# Patient Record
Sex: FEMALE | Race: BLACK OR AFRICAN AMERICAN | Employment: OTHER | URBAN - METROPOLITAN AREA
[De-identification: names, ages, dates, MRNs, and addresses within clinical notes are randomized per-mention and may not be internally consistent; named-entity substitution may affect disease eponyms.]

---

## 2017-03-10 DIAGNOSIS — I10 HYPERTENSION GOAL BP (BLOOD PRESSURE) < 140/90: ICD-10-CM

## 2017-03-10 NOTE — TELEPHONE ENCOUNTER
Bisoprolol Fumarate 10 MG     Last Written Prescription Date: 9/6/16  Last Fill Quantity: 180, # refills: 1  Last Office Visit with G, [facility] or [facility] prescribing provider: 11/28/16       Potassium   Date Value Ref Range Status   10/24/2016 3.5 3.4 - 5.3 mmol/L Final     Creatinine   Date Value Ref Range Status   10/24/2016 1.36 (H) 0.52 - 1.04 mg/dL Final     BP Readings from Last 3 Encounters:   11/28/16 132/62   10/24/16 150/60   05/16/16 120/70

## 2017-03-13 RX ORDER — BISOPROLOL FUMARATE 10 MG/1
TABLET, FILM COATED ORAL
Qty: 180 TABLET | Refills: 2 | Status: SHIPPED | OUTPATIENT
Start: 2017-03-13 | End: 2017-12-14

## 2017-03-13 NOTE — TELEPHONE ENCOUNTER
Prescription approved per [facility] Refill Protocol. No lab  Nicole Diaz RN- Triage [employer]

## 2017-03-29 ENCOUNTER — TRANSFERRED RECORDS (OUTPATIENT)
Dept: HEALTH INFORMATION MANAGEMENT | Facility: CLINIC | Age: 82
End: 2017-03-29

## 2017-03-29 LAB
CREAT SERPL-MCNC: 1.36 MG/DL (ref 0.6–0.88)
GFR SERPL CREATININE-BSD FRML MDRD: 33 ML/MIN/1.73M2
GLUCOSE SERPL-MCNC: 130 MG/DL (ref 65–99)
HBA1C MFR BLD: 7.3 % (ref 4–6)
POTASSIUM SERPL-SCNC: 4.1 MMOL/L (ref 3.5–5.3)
TSH SERPL-ACNC: 2.78 UIU/ML (ref 0.3–5)

## 2017-03-31 DIAGNOSIS — I10 HYPERTENSION GOAL BP (BLOOD PRESSURE) < 140/90: Primary | ICD-10-CM

## 2017-03-31 NOTE — TELEPHONE ENCOUNTER
losartan-hydrochlorothiazide (HYZAAR) 100-25 MG per tablet      Last Written Prescription Date: 9/27/16  Last Fill Quantity: 90, # refills: 1  Last Office Visit with [facility], [facility] or [facility] prescribing provider: 11/28/16       Potassium   Date Value Ref Range Status   10/24/2016 3.5 3.4 - 5.3 mmol/L Final     Creatinine   Date Value Ref Range Status   10/24/2016 1.36 (H) 0.52 - 1.04 mg/dL Final     BP Readings from Last 3 Encounters:   11/28/16 132/62   10/24/16 150/60   05/16/16 120/70

## 2017-04-03 RX ORDER — LOSARTAN POTASSIUM AND HYDROCHLOROTHIAZIDE 25; 100 MG/1; MG/1
TABLET ORAL
Qty: 90 TABLET | Refills: 0 | Status: SHIPPED | OUTPATIENT
Start: 2017-04-03 | End: 2017-06-29

## 2017-04-03 NOTE — TELEPHONE ENCOUNTER
Patient is due for K lab test per 10/2016 lab letter from Dr Carvalho.  I placed a lab order for CMP.  Chapis will be coming tomorro4/4/17  at 10:30 for labs at .  Dr Carvalho, review and add labs as needed.  Nicole Diaz RN- Triage [employer]

## 2017-04-04 DIAGNOSIS — E87.6 HYPOKALEMIA: ICD-10-CM

## 2017-04-04 DIAGNOSIS — I10 HYPERTENSION GOAL BP (BLOOD PRESSURE) < 140/90: ICD-10-CM

## 2017-04-04 PROCEDURE — 80048 BASIC METABOLIC PNL TOTAL CA: CPT | Performed by: INTERNAL MEDICINE

## 2017-04-04 PROCEDURE — 36415 COLL VENOUS BLD VENIPUNCTURE: CPT | Performed by: INTERNAL MEDICINE

## 2017-04-04 NOTE — LETTER
[facility]  [address]  [address]  [place] [address]  [phone]                                                                                                           Chapis Riggs  [address]  [place] [address]    April 5, 2017      Dear Chapis,    The results of your recent tests were reviewed and are enclosed.         These lab results are stable, and the potassium level is much better. You can cut back the potassium to just take one every other day.            Thank you for choosing [facility].  We appreciate the opportunity to serve you and look forward to supporting your healthcare needs in the future.    If you have any questions or concerns, please call me or my staff at [phone].      Sincerely,    Christiano Carvalho MD      Results for orders placed or performed in visit on 04/04/17   Basic metabolic panel  (Ca, Cl, CO2, Creat, Gluc, K, Na, BUN)   Result Value Ref Range    Sodium 143 133 - 144 mmol/L    Potassium 4.6 3.4 - 5.3 mmol/L    Chloride 105 94 - 109 mmol/L    Carbon Dioxide 33 (H) 20 - 32 mmol/L    Anion Gap 5 3 - 14 mmol/L    Glucose 116 (H) 70 - 99 mg/dL    Urea Nitrogen 20 7 - 30 mg/dL    Creatinine 1.14 (H) 0.52 - 1.04 mg/dL    GFR Estimate 44 (L) >60 mL/min/1.7m2    GFR Estimate If Black 54 (L) >60 mL/min/1.7m2    Calcium 10.1 8.5 - 10.1 mg/dL

## 2017-04-05 LAB
ANION GAP SERPL CALCULATED.3IONS-SCNC: 5 MMOL/L (ref 3–14)
BUN SERPL-MCNC: 20 MG/DL (ref 7–30)
CALCIUM SERPL-MCNC: 10.1 MG/DL (ref 8.5–10.1)
CHLORIDE SERPL-SCNC: 105 MMOL/L (ref 94–109)
CO2 SERPL-SCNC: 33 MMOL/L (ref 20–32)
CREAT SERPL-MCNC: 1.14 MG/DL (ref 0.52–1.04)
GFR SERPL CREATININE-BSD FRML MDRD: 44 ML/MIN/1.7M2
GLUCOSE SERPL-MCNC: 116 MG/DL (ref 70–99)
POTASSIUM SERPL-SCNC: 4.6 MMOL/L (ref 3.4–5.3)
SODIUM SERPL-SCNC: 143 MMOL/L (ref 133–144)

## 2017-04-05 RX ORDER — POTASSIUM CHLORIDE 750 MG/1
10 TABLET, EXTENDED RELEASE ORAL EVERY OTHER DAY
Qty: 45 TABLET | Refills: 1 | Status: SHIPPED | OUTPATIENT
Start: 2017-04-05 | End: 2017-04-24

## 2017-04-05 NOTE — PROGRESS NOTES
Results for orders placed or performed in visit on 04/04/17   Basic metabolic panel  (Ca, Cl, CO2, Creat, Gluc, K, Na, BUN)   Result Value Ref Range    Sodium 143 133 - 144 mmol/L    Potassium 4.6 3.4 - 5.3 mmol/L    Chloride 105 94 - 109 mmol/L    Carbon Dioxide 33 (H) 20 - 32 mmol/L    Anion Gap 5 3 - 14 mmol/L    Glucose 116 (H) 70 - 99 mg/dL    Urea Nitrogen 20 7 - 30 mg/dL    Creatinine 1.14 (H) 0.52 - 1.04 mg/dL    GFR Estimate 44 (L) >60 mL/min/1.7m2    GFR Estimate If Black 54 (L) >60 mL/min/1.7m2    Calcium 10.1 8.5 - 10.1 mg/dL     Letter sent.            These lab results are stable, and the potassium level is much better. You can cut back the potassium to just take one every other day.

## 2017-04-24 DIAGNOSIS — E87.6 HYPOKALEMIA: ICD-10-CM

## 2017-04-24 RX ORDER — POTASSIUM CHLORIDE 750 MG/1
10 TABLET, EXTENDED RELEASE ORAL EVERY OTHER DAY
Qty: 45 TABLET | Refills: 1 | Status: SHIPPED | OUTPATIENT
Start: 2017-04-24 | End: 2018-07-23

## 2017-04-24 NOTE — TELEPHONE ENCOUNTER
Potassium 10meq QD    Routing refill request to provider for review/approval because:  Labs out of range:  Creatinine and frequency requested differs from med list sig. Lab letter dated 4-5-17 per Dr. Christiano Carvalho advised patient to cut back to K+ 10meq QOD  Last Written Prescription Date: 4-5-17 at 10meq QOD  Last Fill Quantity: 45, # refills: 1  [facility] Office Visit with [facility] or [facility] prescribing provider: 11-28-16       Potassium   Date Value Ref Range Status   04/04/2017 4.6 3.4 - 5.3 mmol/L Final     Creatinine   Date Value Ref Range Status   04/04/2017 1.14 (H) 0.52 - 1.04 mg/dL Final     BP Readings from Last 3 Encounters:   11/28/16 132/62   10/24/16 150/60   05/16/16 120/70     Refill resent to pharmacy

## 2017-05-07 DIAGNOSIS — I10 HYPERTENSION GOAL BP (BLOOD PRESSURE) < 140/90: ICD-10-CM

## 2017-05-08 RX ORDER — AMLODIPINE BESYLATE 10 MG/1
TABLET ORAL
Qty: 90 TABLET | Refills: 1 | Status: SHIPPED | OUTPATIENT
Start: 2017-05-08 | End: 2017-11-03

## 2017-05-08 NOTE — TELEPHONE ENCOUNTER
Labs not required for this med  Prescription approved per [facility] Refill Protocol.

## 2017-05-08 NOTE — TELEPHONE ENCOUNTER
Amlodipine 10mg      Last Written Prescription Date: 9/27/2016  Last Fill Quantity: 90, # refills: 1  Last Office Visit with G, P or [facility] prescribing provider: 11/28/2016       Potassium   Date Value Ref Range Status   04/04/2017 4.6 3.4 - 5.3 mmol/L Final     Creatinine   Date Value Ref Range Status   04/04/2017 1.14 (H) 0.52 - 1.04 mg/dL Final     BP Readings from Last 3 Encounters:   11/28/16 132/62   10/24/16 150/60   05/16/16 120/70

## 2017-08-07 ENCOUNTER — OFFICE VISIT (OUTPATIENT)
Dept: FAMILY MEDICINE | Facility: CLINIC | Age: 82
End: 2017-08-07
Payer: COMMERCIAL

## 2017-08-07 VITALS
HEART RATE: 78 BPM | HEIGHT: 63 IN | WEIGHT: 119 LBS | BODY MASS INDEX: 21.09 KG/M2 | OXYGEN SATURATION: 100 % | SYSTOLIC BLOOD PRESSURE: 134 MMHG | DIASTOLIC BLOOD PRESSURE: 66 MMHG | RESPIRATION RATE: 20 BRPM | TEMPERATURE: 97.8 F

## 2017-08-07 DIAGNOSIS — E11.9 TYPE 2 DIABETES MELLITUS WITHOUT COMPLICATION, WITH LONG-TERM CURRENT USE OF INSULIN (H): Primary | ICD-10-CM

## 2017-08-07 DIAGNOSIS — I10 HYPERTENSION GOAL BP (BLOOD PRESSURE) < 140/90: ICD-10-CM

## 2017-08-07 DIAGNOSIS — Z79.4 TYPE 2 DIABETES MELLITUS WITHOUT COMPLICATION, WITH LONG-TERM CURRENT USE OF INSULIN (H): Primary | ICD-10-CM

## 2017-08-07 PROCEDURE — 99213 OFFICE O/P EST LOW 20 MIN: CPT | Performed by: INTERNAL MEDICINE

## 2017-08-07 NOTE — PROGRESS NOTES
SUBJECTIVE:                                                    Chapis Riggs is a 89 year old female who presents to clinic today for the following health issues:      Needs form filled out for driving for DM                 Sees endo 2x per year; due in September.                    Uses 8 units 70/30 in the AM , 4 units PM                    No hypoglycemia; needs a form for purposes of her driving privileges.     She also checks her glucose every time before she drives.                                         Problem list and histories reviewed & adjusted, as indicated.      Current Outpatient Prescriptions   Medication Sig Dispense Refill     losartan-hydrochlorothiazide (HYZAAR) 100-25 MG per tablet TAKE ONE TABLET BY MOUTH ONE TIME DAILY 90 tablet 1     amLODIPine (NORVASC) 10 MG tablet TAKE 1 TABLET (10 MG) BY MOUTH DAILY 90 tablet 1     potassium chloride SA (K-DUR/KLOR-CON M) 10 MEQ CR tablet Take 1 tablet (10 mEq) by mouth every other day 45 tablet 1     Bisoprolol Fumarate 10 MG TABS TAKE TWO TABLETS BY MOUTH DAILY 180 tablet 2     pravastatin (PRAVACHOL) 40 MG tablet Take 1 tablet (40 mg) by mouth daily 90 tablet 3     ONE TOUCH ULTRA test strip TEST 4 TIMES DAILY 400 strip 1     blood glucose (ONE TOUCH ULTRA) test strip Pt tests 4 x / day 400 strip 1     insulin NPH-insulin regular (HUMULIN MIX 70/30, NOVOLIN MIX 70/30) SUSP 100 UNIT/ML Inject 4 Units Subcutaneous See Admin Instructions evening       Calcium Citrate-Vitamin D (CITRACAL + D PO) Take 2 tablets by mouth daily 1200 calcium and 1000 iu vit d       glucose blood VI test strips (ONE TOUCH ULTRA TEST) strip Use to test blood sugars 6 to 8 times daily or as directed. 200 strip 9     nitroglycerin (NITROSTAT) 0.4 MG SL tablet Place 1 tablet under the tongue every 5 minutes as needed for chest pain. 25 tablet 3     aspirin 81 MG tablet Take 1 tablet by mouth daily.       Multiple Vitamins-Minerals (CENTRUM SILVER PO) Take 1 tablet by  "mouth.       insulin NPH-insulin regular (NOVOLIN 70/30) (70-30) 100 UNIT/ML injection Inject 8 Units Subcutaneous every morning Patient taking 8Units at 7:30 morning and 5Units at 5:30 evening       naproxen sodium (ALEVE) 220 MG capsule Take 220 mg by mouth as needed 60 capsule      Allergies   Allergen Reactions     Atorvastatin      Dizzy and weak     Lisinopril-Hctz Cough     Sulfa Drugs      BP Readings from Last 3 Encounters:   08/07/17 134/66   11/28/16 132/62   10/24/16 150/60    Wt Readings from Last 3 Encounters:   08/07/17 119 lb (54 kg)   11/28/16 124 lb 9.6 oz (56.5 kg)   10/24/16 124 lb (56.2 kg)                      Reviewed and updated as needed this visit by clinical staffTobacco  Allergies  Meds  Med Hx  Surg Hx  Fam Hx  Soc Hx      Reviewed and updated as needed this visit by Provider         ROS:  CONSTITUTIONAL:NEGATIVE for fever, chills, change in weight  CV: NEGATIVE for chest pain, palpitations or peripheral edema  ENDOCRINE: NEGATIVE for polydipsia and polyuria    OBJECTIVE:                                                    /66 (BP Location: Left arm, Patient Position: Chair, Cuff Size: Adult Regular)  Pulse 78  Temp 97.8  F (36.6  C)  Resp 20  Ht 5' 3\" (1.6 m)  Wt 119 lb (54 kg)  SpO2 100%  Breastfeeding? No  BMI 21.08 kg/m2  Body mass index is 21.08 kg/(m^2).  GENERAL APPEARANCE: healthy, alert and no distress  CV: regular rates and rhythm, normal S1 S2, no S3 or S4 and no murmur, click or rub  NEURO: Normal strength and tone, mentation intact and speech normal    Diagnostic test results:  none        ASSESSMENT/PLAN:                                                        ICD-10-CM    1. Type 2 diabetes mellitus without complication, with long-term current use of insulin (H) E11.9     Z79.4    2. Hypertension goal BP (blood pressure) < 140/90 I10        Should be ok for driving.           DMV form completed.   Follow up with Provider - CPE 10/16 or 11/16     Christiano DAVIES" MD aMia  [facility]

## 2017-08-07 NOTE — NURSING NOTE
"Chief Complaint   Patient presents with     Forms       Initial /66 (BP Location: Left arm, Patient Position: Chair, Cuff Size: Adult Regular)  Pulse 78  Temp 97.8  F (36.6  C)  Resp 20  Ht 5' 3\" (1.6 m)  Wt 119 lb (54 kg)  SpO2 100%  Breastfeeding? No  BMI 21.08 kg/m2 Estimated body mass index is 21.08 kg/(m^2) as calculated from the following:    Height as of this encounter: 5' 3\" (1.6 m).    Weight as of this encounter: 119 lb (54 kg).  Medication Reconciliation: complete   Joy Espinoza LPN  "

## 2017-08-28 ENCOUNTER — OFFICE VISIT (OUTPATIENT)
Dept: FAMILY MEDICINE | Facility: CLINIC | Age: 82
End: 2017-08-28
Payer: COMMERCIAL

## 2017-08-28 ENCOUNTER — RADIANT APPOINTMENT (OUTPATIENT)
Dept: GENERAL RADIOLOGY | Facility: CLINIC | Age: 82
End: 2017-08-28
Attending: INTERNAL MEDICINE
Payer: COMMERCIAL

## 2017-08-28 VITALS
SYSTOLIC BLOOD PRESSURE: 120 MMHG | HEIGHT: 63 IN | OXYGEN SATURATION: 100 % | BODY MASS INDEX: 20.91 KG/M2 | DIASTOLIC BLOOD PRESSURE: 70 MMHG | TEMPERATURE: 97.8 F | WEIGHT: 118 LBS | RESPIRATION RATE: 20 BRPM | HEART RATE: 65 BPM

## 2017-08-28 DIAGNOSIS — M54.50 CHRONIC MIDLINE LOW BACK PAIN WITHOUT SCIATICA: ICD-10-CM

## 2017-08-28 DIAGNOSIS — G89.29 CHRONIC MIDLINE LOW BACK PAIN WITHOUT SCIATICA: ICD-10-CM

## 2017-08-28 DIAGNOSIS — K59.09 OTHER CONSTIPATION: ICD-10-CM

## 2017-08-28 DIAGNOSIS — Z86.0100 HISTORY OF COLONIC POLYPS: ICD-10-CM

## 2017-08-28 DIAGNOSIS — G89.29 CHRONIC MIDLINE LOW BACK PAIN WITHOUT SCIATICA: Primary | ICD-10-CM

## 2017-08-28 DIAGNOSIS — M54.50 CHRONIC MIDLINE LOW BACK PAIN WITHOUT SCIATICA: Primary | ICD-10-CM

## 2017-08-28 PROCEDURE — 99214 OFFICE O/P EST MOD 30 MIN: CPT | Performed by: INTERNAL MEDICINE

## 2017-08-28 PROCEDURE — 72100 X-RAY EXAM L-S SPINE 2/3 VWS: CPT

## 2017-08-28 NOTE — MR AVS SNAPSHOT
After Visit Summary   8/28/2017    Chapis Riggs    MRN: [id number]           Patient Information     Date Of Birth          3/2/1923        Visit Information        Provider Department      8/28/2017 9:30 AM Christiano Carvalho MD [facility]        Today's Diagnoses     Chronic midline low back pain without sciatica    -  1    Other constipation        History of colonic polyps          Care Instructions    Let's do this: go ahead and take 2 Tylenol tablets every day.                Get some Miralax and use a capful or 1/2 of a capful, every few days or even every day.                       Plan on a flexible sigmoidoscopy with Dr Fabian's office.                                  Plan on some physical therapy.          Follow-ups after your visit        Additional Services     COLORECTAL SURGERY REFERRAL       Your provider has referred you to: N: [facility] [phone]   [email or web address]    Referral Reason(s): Flex Sig.                                      (THIS PATIENT HAD A 2.5 CM POLYP REMOVED, AT 25 CM, BY DR FABIAN IN 2004. SHE HAD AN INCOMPLETE COLONOSCOPY IN 2009.    SHE HAS WORSENING CONSTIPATION WITH LOW BACK PAIN)  Special Concerns: Diabetes  This referral is: Elective (week +)  It is OK to leave a message on patient's voicemail.    Please be aware that coverage of these services is subject to the terms and limitations of your health insurance plan.  Call member services at your health plan with any benefit or coverage questions.      Please bring the following with you to your appointment:    (1) Any X-Rays, CTs or MRIs which have been performed.  Contact the facility where they were done to arrange for  prior to your scheduled appointment.    (2) List of current medications  (3) This referral request   (4) Any documents/labs given to you for this referral            TRUPTI PT, HAND, AND  CHIROPRACTIC REFERRAL       **This order will print in the [facility] Scheduling Office**    Physical Therapy, Hand Therapy and Chiropractic Care are available through:    *[facility]  *[facility]  *[facility]    Call one number to schedule at any of the above locations: [phone].    Your provider has referred you to: [facility] - PT and/or Chiropractic Care determined by clinical presentation at [facility] or [facility] Initial Visit    Indication/Reason for Referral: Low Back Pain  Onset of Illness: 6 months  Therapy Orders: Evaluate and Treat  Special Programs: None  Special Request: None    Lucas Sub-Score (Q5-9): 2 (08/28/17 0938)  Lucas Total Score (all 9): 4 (08/28/17 0938)    Additional Comments for the Therapist or Chiropractor: see spine x-ray    Please be aware that coverage of these services is subject to the terms and limitations of your health insurance plan.  Call member services at your health plan with any benefit or coverage questions.      Please bring the following to your appointment:    *Your personal calendar for scheduling future appointments  *Comfortable clothing                  Who to contact     If you have questions or need follow up information about today's clinic visit or your schedule please contact [facility] directly at [phone].  Normal or non-critical lab and imaging results will be communicated to you by MyChart, letter or phone within 4 business days after the clinic has received the results. If you do not hear from us within 7 days, please contact the clinic through MyChart or phone. If you have a critical or abnormal lab result, we will notify you by phone as soon as possible.  Submit refill requests through [facility] or call your pharmacy and they will forward the refill request to us. Please allow 3 business days for your refill to be completed.          Additional Information  "About Your Visit        Care EveryWhere ID     This is your Care EveryWhere ID. This could be used by other organizations to access your [place] medical records  [id number]        Your Vitals Were     Pulse Temperature Respirations Height Pulse Oximetry Breastfeeding?    65 97.8  F (36.6  C) 20 5' 3\" (1.6 m) 100% No    BMI (Body Mass Index)                   20.9 kg/m2            Blood Pressure from Last 3 Encounters:   08/28/17 120/70   08/07/17 134/66   11/28/16 132/62    Weight from Last 3 Encounters:   08/28/17 118 lb (53.5 kg)   08/07/17 119 lb (54 kg)   11/28/16 124 lb 9.6 oz (56.5 kg)              We Performed the Following     COLORECTAL SURGERY REFERRAL     TRUPTI PT, HAND, AND CHIROPRACTIC REFERRAL          Today's Medication Changes          These changes are accurate as of: 8/28/17 10:49 AM.  If you have any questions, ask your nurse or doctor.               Stop taking these medicines if you haven't already. Please contact your care team if you have questions.     ALEVE 220 MG capsule   Generic drug:  naproxen sodium   Stopped by:  Christiano Carvalho MD                    Primary Care Provider Office Phone # Fax #    Christiano Carvalho [phone] [address] [place] [address]        Equal Access to Services     COREEN STEVENS AH: Hadii aad ku hadasho Soomaali, waaxda luqadaha, qaybta kaalmada adeegyada, jc jones. So [facility] [phone].    ATENCIÓN: Si habla español, tiene a rubalcava disposición servicios gratuitos de asistencia lingüística. Llame al [phone].    We comply with applicable federal civil rights laws and [place] laws. We do not discriminate on the basis of race, color, national origin, age, disability sex, sexual orientation or gender identity.            Thank you!     Thank you for choosing [facility]  for your care. Our goal is always to provide you with excellent care. Hearing back from our " patients is one way we can continue to improve our services. Please take a few minutes to complete the written survey that you may receive in the mail after your visit with us. Thank you!             Your Updated Medication List - Protect others around you: Learn how to safely use, store and throw away your medicines at www.disposemymeds.org.          This list is accurate as of: 8/28/17 10:49 AM.  Always use your most recent med list.                   Brand Name Dispense Instructions for use Diagnosis    amLODIPine 10 MG tablet    NORVASC    90 tablet    TAKE 1 TABLET (10 MG) BY MOUTH DAILY    Hypertension goal BP (blood pressure) < 140/90       aspirin 81 MG tablet      Take 1 tablet by mouth daily.        Bisoprolol Fumarate 10 MG Tabs     180 tablet    TAKE TWO TABLETS BY MOUTH DAILY    Hypertension goal BP (blood pressure) < 140/90       * blood glucose monitoring test strip    ONE TOUCH ULTRA    200 strip    Use to test blood sugars 6 to 8 times daily or as directed.    Type 1 diabetes, HbA1c goal < 8% (H)       * blood glucose monitoring test strip    ONE TOUCH ULTRA    400 strip    Pt tests 4 x / day    Type 2 diabetes, HbA1C goal < 8% (H)       * ONE TOUCH ULTRA test strip   Generic drug:  blood glucose monitoring     400 strip    TEST 4 TIMES DAILY    Type 2 diabetes, HbA1C goal < 8% (H)       CENTRUM SILVER PO      Take 1 tablet by mouth.        CITRACAL + D PO      Take 2 tablets by mouth daily 1200 calcium and 1000 iu vit d        * insulin NPH-insulin regular injection    HumuLIN MIX 70/30/NovoLIN MIX 70/30     Inject 4 Units Subcutaneous See Admin Instructions evening        * insulin NPH-insulin regular injection    HumuLIN MIX 70/30/NovoLIN MIX 70/30     Inject 8 Units Subcutaneous every morning Patient taking 8Units at 7:30 morning and 5Units at 5:30 evening        losartan-hydrochlorothiazide 100-25 MG per tablet    HYZAAR    90 tablet    TAKE ONE TABLET BY MOUTH ONE TIME DAILY    Hypertension  goal BP (blood pressure) < 140/90       nitroGLYcerin 0.4 MG sublingual tablet    NITROSTAT    25 tablet    Place 1 tablet under the tongue every 5 minutes as needed for chest pain.    Angina effort (H)       potassium chloride SA 10 MEQ CR tablet    K-DUR/KLOR-CON M    45 tablet    Take 1 tablet (10 mEq) by mouth every other day    Hypokalemia       pravastatin 40 MG tablet    PRAVACHOL    90 tablet    Take 1 tablet (40 mg) by mouth daily    Hyperlipidemia LDL goal <100       * Notice:  This list has 5 medication(s) that are the same as other medications prescribed for you. Read the directions carefully, and ask your doctor or other care provider to review them with you.

## 2017-08-28 NOTE — PROGRESS NOTES
"  SUBJECTIVE:   Chapis Riggs is a 89 year old female who presents to clinic today for the following health issues:      Back Pain       Duration: unknown        Specific cause: none    Description:   Location of pain: low back-bi-lat and mid area  Character of pain: dull ache, gnawing, cramping and constant  Pain radiation:radiates into the left leg  New numbness or weakness in legs, not attributed to pain:  no     Intensity: moderate    History:   Pain interferes with job: Not applicable,   History of back problems: no prior back problems  Any previous MRI or X-rays: None  Sees a specialist for back pain:  No  Therapies tried without relief: none    Alleviating factors:   Improved by: acetaminophen (Tylenol)      Precipitating factors:  Worsened by: any activity really    Functional and Psychosocial Screen (DocVue STarT Back):      Most recent score:    CHRISTO START BACK TOTAL SCORE 8/28/2017   Total Score (all 9) 4                 Also discuss \"bowels issues with this back pain                                             6 months or so of low back pain; not clearly having radicular sx.          Good relief \"for a day and a half\" with APAP.                                Passing firm stools.           Sometimes they are hard pellets.         She is concerned about her colon.         Recall that she had a large polyp removed from the sigmoid colon 13 years ago.               She had a partial colonoscopy in 2009.   Problem list and histories reviewed & adjusted, as indicated.      C/o chronic cough; no change.                      A [facility] nurse recommended screening mammogram,bone density,colonoscopy,and a hearing test!  Current Outpatient Prescriptions   Medication Sig Dispense Refill     losartan-hydrochlorothiazide (HYZAAR) 100-25 MG per tablet TAKE ONE TABLET BY MOUTH ONE TIME DAILY 90 tablet 1     amLODIPine (NORVASC) 10 MG tablet TAKE 1 TABLET (10 MG) BY MOUTH DAILY 90 tablet 1     potassium " chloride SA (K-DUR/KLOR-CON M) 10 MEQ CR tablet Take 1 tablet (10 mEq) by mouth every other day 45 tablet 1     Bisoprolol Fumarate 10 MG TABS TAKE TWO TABLETS BY MOUTH DAILY 180 tablet 2     pravastatin (PRAVACHOL) 40 MG tablet Take 1 tablet (40 mg) by mouth daily 90 tablet 3     ONE TOUCH ULTRA test strip TEST 4 TIMES DAILY 400 strip 1     blood glucose (ONE TOUCH ULTRA) test strip Pt tests 4 x / day 400 strip 1     insulin NPH-insulin regular (HUMULIN MIX 70/30, NOVOLIN MIX 70/30) SUSP 100 UNIT/ML Inject 4 Units Subcutaneous See Admin Instructions evening       Calcium Citrate-Vitamin D (CITRACAL + D PO) Take 2 tablets by mouth daily 1200 calcium and 1000 iu vit d       glucose blood VI test strips (ONE TOUCH ULTRA TEST) strip Use to test blood sugars 6 to 8 times daily or as directed. 200 strip 9     nitroglycerin (NITROSTAT) 0.4 MG SL tablet Place 1 tablet under the tongue every 5 minutes as needed for chest pain. 25 tablet 3     aspirin 81 MG tablet Take 1 tablet by mouth daily.       Multiple Vitamins-Minerals (CENTRUM SILVER PO) Take 1 tablet by mouth.       insulin NPH-insulin regular (NOVOLIN 70/30) (70-30) 100 UNIT/ML injection Inject 8 Units Subcutaneous every morning Patient taking 8Units at 7:30 morning and 5Units at 5:30 evening       Allergies   Allergen Reactions     Atorvastatin      Dizzy and weak     Lisinopril-Hctz Cough     Sulfa Drugs      BP Readings from Last 3 Encounters:   08/28/17 120/70   08/07/17 134/66   11/28/16 132/62    Wt Readings from Last 3 Encounters:   08/28/17 118 lb (53.5 kg)   08/07/17 119 lb (54 kg)   11/28/16 124 lb 9.6 oz (56.5 kg)                      Reviewed and updated as needed this visit by clinical staffTobacco  Allergies  Meds  Med Hx  Surg Hx  Fam Hx  Soc Hx      Reviewed and updated as needed this visit by Provider         ROS:  CONSTITUTIONAL:POSITIVE  for weight loss of approximately 5 pounds since last fall  GI: NEGATIVE for hematochezia and melena  :  "negative for incontinence or retention  MUSCULOSKELETAL: NEGATIVE for injury to her back  NEURO: NEGATIVE for paresthesias legs and radicular pain legs    OBJECTIVE:                                                    /70 (BP Location: Left arm, Patient Position: Chair, Cuff Size: Adult Regular)  Pulse 65  Temp 97.8  F (36.6  C)  Resp 20  Ht 5' 3\" (1.6 m)  Wt 118 lb (53.5 kg)  SpO2 100%  Breastfeeding? No  BMI 20.9 kg/m2  Body mass index is 20.9 kg/(m^2).  GENERAL APPEARANCE: alert and no distress  ABDOMEN: soft, nontender, without hepatosplenomegaly or masses  MS: decreased range of motion lumbar spine    Diagnostic test results:  Xray -   Recent Results (from the past 744 hour(s))   XR Lumbar Spine 2/3 Views    Narrative    LUMBAR SPINE TWO TO THREE VIEWS 8/28/2017 10:30 AM     HISTORY: Low back pain.    COMPARISON: None.      Impression    IMPRESSION: There are five lumbar type vertebrae. There is severe  facet arthropathy in the mid and lower lumbar spine. Moderate to  severe multilevel degenerative disc disease. Grade 1 anterolisthesis  at L3-L4 and L4-L5. The vertebral body heights are maintained.    BERTIN VIZCARRA MD            ASSESSMENT/PLAN:                                                      ICD-10-CM    1. Chronic midline low back pain without sciatica M54.5 XR Lumbar Spine 2/3 Views    G89.29    2. Other constipation K59.09    3. History of colonic polyps Z86.010 COLORECTAL SURGERY REFERRAL       There are multiple reasons for low back pain on this spine x-ray.                    After the visit was completed, the patient wanted me to meet her daughter who was waiting in the waiting room.                  We brought her into the exam room,and went over the findings again. The daughter was especially eager to have her mother undergo some physical therapy.                  Fortunately she gets good relief with just 2 Tylenol tablets per day.  Patient Instructions   Let's do this: go ahead " and take 2 Tylenol tablets every day.                Get some Miralax and use a capful or 1/2 of a capful, every few days or even every day.                       Plan on a flexible sigmoidoscopy with Dr Fabian's office.                                  Plan on some physical therapy.      Christiano Carvalho MD  [facility]

## 2017-08-28 NOTE — NURSING NOTE
"Chief Complaint   Patient presents with     Musculoskeletal Problem       Initial /70 (BP Location: Left arm, Patient Position: Chair, Cuff Size: Adult Regular)  Pulse 65  Temp 97.8  F (36.6  C)  Resp 20  Ht 5' 3\" (1.6 m)  Wt 118 lb (53.5 kg)  SpO2 100%  Breastfeeding? No  BMI 20.9 kg/m2 Estimated body mass index is 20.9 kg/(m^2) as calculated from the following:    Height as of this encounter: 5' 3\" (1.6 m).    Weight as of this encounter: 118 lb (53.5 kg).  Medication Reconciliation: complete   Joy Espinoza LPN  "

## 2017-08-28 NOTE — PATIENT INSTRUCTIONS
Let's do this: go ahead and take 2 Tylenol tablets every day.                Get some Miralax and use a capful or 1/2 of a capful, every few days or even every day.                       Plan on a flexible sigmoidoscopy with Dr Fabian's office.                                  Plan on some physical therapy.

## 2017-09-06 ENCOUNTER — SURGERY (OUTPATIENT)
Age: 82
End: 2017-09-06

## 2017-09-06 ENCOUNTER — HOSPITAL ENCOUNTER (OUTPATIENT)
Facility: CLINIC | Age: 82
Discharge: HOME | End: 2017-09-06
Attending: COLON & RECTAL SURGERY | Admitting: COLON & RECTAL SURGERY
Payer: COMMERCIAL

## 2017-09-06 VITALS
SYSTOLIC BLOOD PRESSURE: 107 MMHG | OXYGEN SATURATION: 97 % | DIASTOLIC BLOOD PRESSURE: 82 MMHG | RESPIRATION RATE: 22 BRPM

## 2017-09-06 LAB — FLEXIBLE SIGMOIDOSCOPY: NORMAL

## 2017-09-06 PROCEDURE — 45330 DIAGNOSTIC SIGMOIDOSCOPY: CPT | Performed by: COLON & RECTAL SURGERY

## 2017-09-06 RX ORDER — ONDANSETRON 2 MG/ML
4 INJECTION INTRAMUSCULAR; INTRAVENOUS
Status: DISCONTINUED | OUTPATIENT
Start: 2017-09-06 | End: 2017-09-06 | Stop reason: HOSPADM

## 2017-09-06 RX ORDER — LIDOCAINE 40 MG/G
CREAM TOPICAL
Status: DISCONTINUED | OUTPATIENT
Start: 2017-09-06 | End: 2017-09-06 | Stop reason: HOSPADM

## 2017-09-06 NOTE — H&P
Pre-Endoscopy History and Physical     Chapis Riggs MRN# [id number]   YOB: 1923 Age: 89 year old     Date of Procedure: 9/6/2017  Primary care provider: Christiano Carvalho  Type of Endoscopy: flex sig  Reason for Procedure: bowel habit changes  Type of Anesthesia Anticipated: none    HPI:    Chapis is a 89 year old female who will be undergoing the above procedure.      A history and physical has been performed. The patient's medications and allergies have been reviewed. The risks and benefits of the procedure including the risk of bleeding, perforation, and missed lesions as well as the sedation options and risks were discussed with the patient.  All questions were answered and informed consent was obtained.      Allergies   Allergen Reactions     Atorvastatin      Dizzy and weak     Lisinopril-Hctz Cough     Sulfa Drugs         Current Facility-Administered Medications   Medication     lidocaine 1 % 1 mL     lidocaine (LMX4) cream     sodium chloride (PF) 0.9% PF flush 3 mL     sodium chloride (PF) 0.9% PF flush 3 mL     sodium chloride (PF) 0.9% PF flush 3 mL     ondansetron (ZOFRAN) injection 4 mg       Prescriptions Prior to Admission   Medication Sig Dispense Refill Last Dose     losartan-hydrochlorothiazide (HYZAAR) 100-25 MG per tablet TAKE ONE TABLET BY MOUTH ONE TIME DAILY 90 tablet 1 9/6/2017     amLODIPine (NORVASC) 10 MG tablet TAKE 1 TABLET (10 MG) BY MOUTH DAILY 90 tablet 1 9/5/2017     potassium chloride SA (K-DUR/KLOR-CON M) 10 MEQ CR tablet Take 1 tablet (10 mEq) by mouth every other day 45 tablet 1 9/5/2017     Bisoprolol Fumarate 10 MG TABS TAKE TWO TABLETS BY MOUTH DAILY 180 tablet 2 9/5/2017     pravastatin (PRAVACHOL) 40 MG tablet Take 1 tablet (40 mg) by mouth daily 90 tablet 3 9/5/2017     insulin NPH-insulin regular (HUMULIN MIX 70/30, NOVOLIN MIX 70/30) SUSP 100 UNIT/ML Inject 4 Units Subcutaneous See Admin Instructions evening   9/5/2017     Calcium Citrate-Vitamin D  (CITRACAL + D PO) Take 2 tablets by mouth daily 1200 calcium and 1000 iu vit d   9/5/2017     aspirin 81 MG tablet Take 1 tablet by mouth daily.   9/5/2017     Multiple Vitamins-Minerals (CENTRUM SILVER PO) Take 1 tablet by mouth.   9/5/2017     insulin NPH-insulin regular (NOVOLIN 70/30) (70-30) 100 UNIT/ML injection Inject 8 Units Subcutaneous every morning Patient taking 8Units at 7:30 morning and 5Units at 5:30 evening   9/5/2017     ONE TOUCH ULTRA test strip TEST 4 TIMES DAILY 400 strip 1 Taking     blood glucose (ONE TOUCH ULTRA) test strip Pt tests 4 x / day 400 strip 1 Taking     glucose blood VI test strips (ONE TOUCH ULTRA TEST) strip Use to test blood sugars 6 to 8 times daily or as directed. 200 strip 9 Taking     nitroglycerin (NITROSTAT) 0.4 MG SL tablet Place 1 tablet under the tongue every 5 minutes as needed for chest pain. 25 tablet 3 Taking       Patient Active Problem List   Diagnosis     Hyperlipidemia LDL goal <100     Diverticulitis of colon     Other and unspecified angina pectoris     Hypertension goal BP (blood pressure) < 140/90     Memory loss     Preventive measure     Type 2 diabetes mellitus without complication (H)     Arthritis     History of colonic polyps     Ganglion cyst     CAD (coronary artery disease)     Cough     ACP (advance care planning)     Epistaxis     Chronic midline low back pain without sciatica        Past Medical History:   Diagnosis Date     Achilles bursitis or tendinitis      Contact dermatitis and other eczema, due to unspecified cause      Diverticulitis, colonic      Enlargement of lymph nodes      Hypertension      Other and unspecified angina pectoris      Pain in joint, shoulder region      Type 2 diabetes, HbA1C goal < 8% (H) 1/28/2014    Managed by         Past Surgical History:   Procedure Laterality Date     ANAL PAPILLA      PROLAPSED     BREAST SURGERY      BIOPSY     GYN SURGERY      HYSTEROSCOPY/TUBAL     REPAIR PTOSIS BILATERAL   "11/8/2011    Procedure:REPAIR PTOSIS BILATERAL; BILATERAL UPPER LID PTOSIS REPAIR; Surgeon:SALINAS DUNN; Location:[facility]       Social History   Substance Use Topics     Smoking status: Never Smoker     Smokeless tobacco: Never Used     Alcohol use No       Family History   Problem Relation Age of Onset     DIABETES Mother      CANCER Son      GI cancer; : type?      Endocrine Disease No family hx of          PHYSICAL EXAM:   /81  Resp 24  SpO2 99% Estimated body mass index is 20.9 kg/(m^2) as calculated from the following:    Height as of 8/28/17: 1.6 m (5' 3\").    Weight as of 8/28/17: 53.5 kg (118 lb).   Mental status - alert and oriented  RESP: lungs clear  CV: RRR  AIRWAY EXAM: Mallampatti Class II (visualization of the soft palate, fauces, and uvula)    IMPRESSION   ASA Class 2 - Mild systemic disease      Signed Electronically by: Thang Begum  September 6, 2017    Colorectal Surgery  [phone] (office)  [phone] (pager)  [email or web address]          "

## 2017-11-03 DIAGNOSIS — I10 HYPERTENSION GOAL BP (BLOOD PRESSURE) < 140/90: ICD-10-CM

## 2017-11-03 RX ORDER — AMLODIPINE BESYLATE 10 MG/1
TABLET ORAL
Qty: 90 TABLET | Refills: 0 | Status: SHIPPED | OUTPATIENT
Start: 2017-11-03 | End: 2018-02-07

## 2017-11-03 NOTE — TELEPHONE ENCOUNTER
Prescription approved per [facility] Refill Protocol.    Last Office Visit with [facility], [facility] or [facility] prescribing provider: 08/28/2017       Potassium   Date Value Ref Range Status   04/04/2017 4.6 3.4 - 5.3 mmol/L Final     Creatinine   Date Value Ref Range Status   04/04/2017 1.14 (H) 0.52 - 1.04 mg/dL Final     BP Readings from Last 3 Encounters:   09/06/17 107/82   08/28/17 120/70   08/07/17 134/66

## 2017-11-27 DIAGNOSIS — E78.5 HYPERLIPIDEMIA LDL GOAL <100: ICD-10-CM

## 2017-11-27 RX ORDER — PRAVASTATIN SODIUM 40 MG
TABLET ORAL
Qty: 90 TABLET | Refills: 2 | Status: SHIPPED | OUTPATIENT
Start: 2017-11-27 | End: 2018-09-04

## 2017-11-27 NOTE — TELEPHONE ENCOUNTER
Last OV 08/28/2017.    Routing refill request to provider for review/approval because:  Labs not current:  LDL  Patient is requesting 90 day supply.   Allergy warning        Requested Prescriptions   Pending Prescriptions Disp Refills     pravastatin (PRAVACHOL) 40 MG tablet [Pharmacy Med Name: PRAVASTATIN SODIUM 40 MG TAB] 90 tablet 2     Sig: TAKE 1 TABLET (40 MG) BY MOUTH DAILY    Statins Protocol Failed    11/27/2017  1:08 PM       Failed - LDL on file in past 12 months    Recent Labs   Lab Test  10/24/16   1137   LDL  91            Passed - No abnormal creatine kinase in past 12 months    No lab results found.         Passed - Recent or future visit with authorizing provider    Patient had office visit in the last year or has a visit in the next 30 days with authorizing provider.  See chart review.              Passed - Patient is age 18 or older       Passed - No active pregnancy on record       Passed - No positive pregnancy test in past 12 months

## 2017-12-01 DIAGNOSIS — I20.89 ANGINA EFFORT: ICD-10-CM

## 2017-12-06 RX ORDER — NITROGLYCERIN 0.4 MG/1
0.4 TABLET SUBLINGUAL EVERY 5 MIN PRN
Qty: 25 TABLET | Refills: 3 | Status: SHIPPED | OUTPATIENT
Start: 2017-12-06

## 2017-12-06 NOTE — TELEPHONE ENCOUNTER
Requested Prescriptions   Pending Prescriptions Disp Refills     nitroGLYcerin (NITROSTAT) 0.4 MG sublingual tablet 25 tablet 3     Sig: Place 1 tablet (0.4 mg) under the tongue every 5 minutes as needed for chest pain    Nitrates Failed    12/1/2017 11:57 AM       Failed - Sublingual nitro order needs review    If refill exceeds 1 bottle per month, please forward request to provider.          Passed - Blood pressure under 140/90    BP Readings from Last 3 Encounters:   09/06/17 107/82   08/28/17 120/70   08/07/17 134/66                Passed - Pt is not on erectile dysfunction medications       Passed - Recent or future visit with authorizing provider's specialty    Patient had office visit in the last year or has a visit in the next 30 days with authorizing provider.  See chart review.              Passed - Patient is age 18 or older        11- last refill  Prescription approved per [facility] Refill Protocol.

## 2017-12-14 DIAGNOSIS — I10 HYPERTENSION GOAL BP (BLOOD PRESSURE) < 140/90: ICD-10-CM

## 2017-12-18 RX ORDER — BISOPROLOL FUMARATE 10 MG/1
TABLET, FILM COATED ORAL
Qty: 180 TABLET | Refills: 2 | Status: SHIPPED | OUTPATIENT
Start: 2017-12-18 | End: 2018-09-04

## 2017-12-18 NOTE — TELEPHONE ENCOUNTER
Last OV 08/28/2017.  Requested Prescriptions   Pending Prescriptions Disp Refills     Bisoprolol Fumarate 10 MG TABS [Pharmacy Med Name: BISOPROLOL FUMARATE 10 MG TAB] 180 tablet 2     Sig: TAKE TWO TABLETS BY MOUTH DAILY    Beta-Blockers Protocol Passed    12/14/2017  5:55 AM       Passed - Blood pressure under 140/90    BP Readings from Last 3 Encounters:   09/06/17 107/82   08/28/17 120/70   08/07/17 134/66                Passed - Patient is age 6 or older       Passed - Recent or future visit with authorizing provider's specialty    Patient had office visit in the last year or has a visit in the next 30 days with authorizing provider.  See chart review.

## 2018-01-16 ENCOUNTER — TELEPHONE (OUTPATIENT)
Dept: FAMILY MEDICINE | Facility: CLINIC | Age: 83
End: 2018-01-16

## 2018-01-16 DIAGNOSIS — I10 HYPERTENSION GOAL BP (BLOOD PRESSURE) < 140/90: ICD-10-CM

## 2018-01-16 RX ORDER — LOSARTAN POTASSIUM AND HYDROCHLOROTHIAZIDE 25; 100 MG/1; MG/1
1 TABLET ORAL DAILY
Qty: 90 TABLET | Refills: 1 | Status: CANCELLED | OUTPATIENT
Start: 2018-01-16

## 2018-01-16 NOTE — TELEPHONE ENCOUNTER
Patient called back to reception and said she has already filled this med in [place]. Rx request cancelled.

## 2018-01-16 NOTE — TELEPHONE ENCOUNTER
Reason for Call:  Medication or medication refill:    Do you use a [facility]?  Name of the pharmacy and phone number for the current request:  [place]. Going to call back    Name of the medication requested: Losartan-hydrochlorothiazide 100-25 mg    Other request: Patient is in [place] and forgot her medication. Will be in [place] for a week. Needs about a weeks worth of pills sent to pharmacy. Will call back with the pharmacy number. Phone is any questions is son in laws number    Can we leave a detailed message on this number? YES    Phone number patient can be reached at: Other phone number:  [phone]    Best Time: any    Call taken on 1/16/2018 at 7:30 AM by [place]

## 2018-06-21 DIAGNOSIS — I10 HYPERTENSION GOAL BP (BLOOD PRESSURE) < 140/90: ICD-10-CM

## 2018-06-21 NOTE — TELEPHONE ENCOUNTER
"LOSARTAN-HCTZ 100-25 MG TAB  Last Written Prescription Date:  01/04/18  Last Fill Quantity: 90,  # refills: 1   Last office visit: 8/28/2017 with prescribing provider:  08/28/17   Future Office Visit:    Requested Prescriptions   Pending Prescriptions Disp Refills     losartan-hydrochlorothiazide (HYZAAR) 100-25 MG per tablet [Pharmacy Med Name: LOSARTAN-HCTZ 100-25 MG TAB] 90 tablet 0     Sig: TAKE ONE TABLET BY MOUTH ONE TIME DAILY    Angiotensin-II Receptors Failed    6/21/2018  9:16 AM       Failed - Normal serum creatinine on file in past 12 months    Recent Labs   Lab Test  04/04/17   1026   CR  1.14*            Failed - Normal serum potassium on file in past 12 months    Recent Labs   Lab Test  04/04/17   1026   POTASSIUM  4.6                   Passed - Blood pressure under 140/90 in past 12 months    BP Readings from Last 3 Encounters:   09/06/17 107/82   08/28/17 120/70   08/07/17 134/66                Passed - Recent (12 mo) or future (30 days) visit within the authorizing provider's specialty    Patient had office visit in the last 12 months or has a visit in the next 30 days with authorizing provider or within the authorizing provider's specialty.  See \"Patient Info\" tab in inbasket, or \"Choose Columns\" in Meds & Orders section of the refill encounter.           Passed - Patient is age 18 or older       Passed - No active pregnancy on record       Passed - No positive pregnancy test in past 12 months          "

## 2018-06-22 RX ORDER — LOSARTAN POTASSIUM AND HYDROCHLOROTHIAZIDE 25; 100 MG/1; MG/1
TABLET ORAL
Qty: 90 TABLET | Refills: 0 | Status: SHIPPED | OUTPATIENT
Start: 2018-06-22 | End: 2018-09-04

## 2018-06-25 ENCOUNTER — RADIANT APPOINTMENT (OUTPATIENT)
Dept: GENERAL RADIOLOGY | Facility: CLINIC | Age: 83
End: 2018-06-25
Attending: PHYSICIAN ASSISTANT
Payer: COMMERCIAL

## 2018-06-25 ENCOUNTER — OFFICE VISIT (OUTPATIENT)
Dept: FAMILY MEDICINE | Facility: CLINIC | Age: 83
End: 2018-06-25
Payer: COMMERCIAL

## 2018-06-25 VITALS
WEIGHT: 122 LBS | DIASTOLIC BLOOD PRESSURE: 72 MMHG | SYSTOLIC BLOOD PRESSURE: 164 MMHG | TEMPERATURE: 97.9 F | BODY MASS INDEX: 21.61 KG/M2 | HEART RATE: 68 BPM | OXYGEN SATURATION: 98 %

## 2018-06-25 DIAGNOSIS — Z79.4 TYPE 2 DIABETES MELLITUS WITHOUT COMPLICATION, WITH LONG-TERM CURRENT USE OF INSULIN (H): ICD-10-CM

## 2018-06-25 DIAGNOSIS — E78.5 HYPERLIPIDEMIA LDL GOAL <100: ICD-10-CM

## 2018-06-25 DIAGNOSIS — I25.10 CORONARY ARTERY DISEASE INVOLVING NATIVE CORONARY ARTERY OF NATIVE HEART WITHOUT ANGINA PECTORIS: ICD-10-CM

## 2018-06-25 DIAGNOSIS — E11.9 TYPE 2 DIABETES MELLITUS WITHOUT COMPLICATION, WITH LONG-TERM CURRENT USE OF INSULIN (H): ICD-10-CM

## 2018-06-25 DIAGNOSIS — I10 HYPERTENSION GOAL BP (BLOOD PRESSURE) < 140/90: ICD-10-CM

## 2018-06-25 DIAGNOSIS — M25.551 HIP PAIN, RIGHT: ICD-10-CM

## 2018-06-25 DIAGNOSIS — M25.551 HIP PAIN, RIGHT: Primary | ICD-10-CM

## 2018-06-25 LAB
ERYTHROCYTE [DISTWIDTH] IN BLOOD BY AUTOMATED COUNT: 14.4 % (ref 10–15)
HBA1C MFR BLD: 7.1 % (ref 0–5.6)
HCT VFR BLD AUTO: 42.6 % (ref 35–47)
HGB BLD-MCNC: 14.1 G/DL (ref 11.7–15.7)
MCH RBC QN AUTO: 29.4 PG (ref 26.5–33)
MCHC RBC AUTO-ENTMCNC: 33.1 G/DL (ref 31.5–36.5)
MCV RBC AUTO: 89 FL (ref 78–100)
PLATELET # BLD AUTO: 201 10E9/L (ref 150–450)
RBC # BLD AUTO: 4.8 10E12/L (ref 3.8–5.2)
WBC # BLD AUTO: 4.1 10E9/L (ref 4–11)

## 2018-06-25 PROCEDURE — 73502 X-RAY EXAM HIP UNI 2-3 VIEWS: CPT | Mod: FY

## 2018-06-25 PROCEDURE — 80061 LIPID PANEL: CPT | Performed by: PHYSICIAN ASSISTANT

## 2018-06-25 PROCEDURE — 99214 OFFICE O/P EST MOD 30 MIN: CPT | Performed by: PHYSICIAN ASSISTANT

## 2018-06-25 PROCEDURE — 83036 HEMOGLOBIN GLYCOSYLATED A1C: CPT | Performed by: PHYSICIAN ASSISTANT

## 2018-06-25 PROCEDURE — 72100 X-RAY EXAM L-S SPINE 2/3 VWS: CPT | Mod: FY

## 2018-06-25 PROCEDURE — 80053 COMPREHEN METABOLIC PANEL: CPT | Performed by: PHYSICIAN ASSISTANT

## 2018-06-25 PROCEDURE — 85027 COMPLETE CBC AUTOMATED: CPT | Performed by: PHYSICIAN ASSISTANT

## 2018-06-25 PROCEDURE — 36415 COLL VENOUS BLD VENIPUNCTURE: CPT | Performed by: PHYSICIAN ASSISTANT

## 2018-06-25 RX ORDER — GABAPENTIN 100 MG/1
100 CAPSULE ORAL 3 TIMES DAILY PRN
Qty: 90 CAPSULE | Refills: 0 | Status: CANCELLED | OUTPATIENT
Start: 2018-06-25

## 2018-06-25 NOTE — PROGRESS NOTES
"  SUBJECTIVE:   Chapis Riggs is a 89 year old female who presents to clinic today for the following health issues:      Musculoskeletal problem/pain      Duration: 1 week    Description  Location: legs and low back    Intensity:  moderate    Accompanying signs and symptoms: swelling and pain, creepy crawling feeling    History  Previous similar problem: no   Previous evaluation:  none    Precipitating or alleviating factors:  Trauma or overuse: no   Aggravating factors include: overuse    Therapies tried and outcome: acetaminophen      HPI additional notes:   Chief Complaint   Patient presents with     Musculoskeletal Problem     Chapis presents today with her daughter. Patient reports onset of \"worm crawling up my leg\" on lateral Rt lower leg 1 week ago. Patient is a diabetic, so thought initially it may be nerve pain. Quickly developed into pain radiating throughout entire RLE. Pain worse with weight-bearing, aemna going from stand to sit. Gets acutely worsening Rt hip and buttock pain when WB. Denies f/c/s, known injury, numbness or focal weakness, peripheral edema.      ROS:    A Comprehensive greater than 10 system review of systems was carried out.    Pertinent positives and negatives are noted above.  Otherwise negative for contributory information.      Chart Review:  History   Smoking Status     Never Smoker   Smokeless Tobacco     Never Used       Patient Active Problem List   Diagnosis     Hyperlipidemia LDL goal <100     Diverticulitis of colon     Other and unspecified angina pectoris     Hypertension goal BP (blood pressure) < 140/90     Memory loss     Preventive measure     Type 2 diabetes mellitus without complication (H)     Arthritis     History of colonic polyps     Ganglion cyst     CAD (coronary artery disease)     Cough     ACP (advance care planning)     Epistaxis     Chronic midline low back pain without sciatica     Past Surgical History:   Procedure Laterality Date     ANAL PAPILLA   "    PROLAPSED     BREAST SURGERY      BIOPSY     GYN SURGERY      HYSTEROSCOPY/TUBAL     REPAIR PTOSIS BILATERAL  11/8/2011    Procedure:REPAIR PTOSIS BILATERAL; BILATERAL UPPER LID PTOSIS REPAIR; Surgeon:SALINAS DUNN; Location: [facility]     SIGMOIDOSCOPY FLEXIBLE N/A 9/6/2017    Procedure: SIGMOIDOSCOPY FLEXIBLE;  SIGMOIDOSCOPY FLEXIBLE;  Surgeon: Thang Begum MD;  Location:  GI     Problem list, Medication list, Allergies, Medical/Social/Surg hx reviewed in [facility], updated as appropriate.   OBJECTIVE:                                                    /72 (Cuff Size: Adult Regular)  Pulse 68  Temp 97.9  F (36.6  C) (Tympanic)  Wt 122 lb (55.3 kg)  SpO2 98%  BMI 21.61 kg/m2  Body mass index is 21.61 kg/(m^2).  GENERAL: thin, no acute distress  PSYCH: pleasant, cooperative  EYES: no discharge, no injection  HENT:  Normocephalic. Moist mucus membranes.  NECK:  Supple, symmetric  EXTREMITIES:  No gross deformities, moves all 4 limbs spontaneously, no peripheral edema  SKIN:  Warm and dry, no rash or suspicious lesions    NEUROLOGIC: alert, sensation grossly intact.  ORTHO: Rt Lower Leg Exam: Inspection; no swelling, no ecchymosis, no deformity  Non-tender: throughout  Strength: full  Lumber/Thoracic Spine Exam:   Non-tender:  throughout  Range of Motion: decreased in both flexion and extension, non-painful  Rt Hip Exam: Hip ROM full    Diagnostic test results: XR Rt hip and pelvis - OA Rt hip, no acute fracture or dislocation by my read, awaiting radiology report  XR lumbar spine - negative by my read, awaiting radiology report     ASSESSMENT/PLAN:                                                          ICD-10-CM    1. Hip pain, right M25.551 XR Pelvis and Hip Right 1 View     XR Lumbar Spine 2/3 Views   2. Type 2 diabetes mellitus without complication, with long-term current use of insulin (H) E11.9 Hemoglobin A1c    Z79.4 Comprehensive metabolic panel   3. Hypertension goal BP (blood pressure) <  "140/90 I10 Comprehensive metabolic panel   4. Hyperlipidemia LDL goal <100 E78.5 Comprehensive metabolic panel     Lipid panel reflex to direct LDL Non-fasting   5. Coronary artery disease involving native coronary artery of native heart without angina pectoris I25.10 CBC with platelets     XR reassuring for no hip, pelvic, or spinal fracture; cannot rule out acute compression fracture on XR alone but no spinal tenderness on exam so suspicion is low and pain mainly in Rt hip/buttock. Suspect Rt hip OA contributing to pain, as well as possible neuropathy component given \"crawling\" sensation. It has been >1 year since last a1c, will recheck today, as well as CMP, CBC for completeness. For pain, recommend acetaminophen 500 mg QID and heat prn. WBAT. Offered trial gabapentin for likely neuropathic pain but patient declines, \"I'm not a pill popper.\"     Please see patient instructions for treatment details.  25 minutes total spent during this visit, >50% spent in counseling and coordination of patient care.    Follow up in 1-2 weeks if not improving as anticipated, sooner PRN.    Uma Molina PA-C  [facility]       "

## 2018-06-25 NOTE — MR AVS SNAPSHOT
After Visit Summary   6/25/2018    Chapis Riggs    MRN: [id number]           Patient Information     Date Of Birth          3/2/1923        Visit Information        Provider Department      6/25/2018 10:30 AM Uma Molina PA-C [facility]        Today's Diagnoses     Hip pain, right    -  1    Type 2 diabetes mellitus without complication, with long-term current use of insulin (H)        Hypertension goal BP (blood pressure) < 140/90        Hyperlipidemia LDL goal <100        Coronary artery disease involving native coronary artery of native heart without angina pectoris           Follow-ups after your visit        Follow-up notes from your care team     Return if symptoms worsen or fail to improve.      Your next 10 appointments already scheduled     Jul 09, 2018 10:00 AM CDT   PHYSICAL with Christiano Carvalho MD   [facility] ([facility])    [address] [place] [id number] [phone]              Who to contact     If you have questions or need follow up information about today's clinic visit or your schedule please contact [facility] directly at [phone].  Normal or non-critical lab and imaging results will be communicated to you by MyChart, letter or phone within 4 business days after the clinic has received the results. If you do not hear from us within 7 days, please contact the clinic through MyChart or phone. If you have a critical or abnormal lab result, we will notify you by phone as soon as possible.  Submit refill requests through [employer] or call your pharmacy and they will forward the refill request to us. Please allow 3 business days for your refill to be completed.          Additional Information About Your Visit        Care EveryWhere ID     This is your Care EveryWhere ID. This could be used by other  organizations to access your [facility] medical records  [id number]        Your Vitals Were     Pulse Temperature Pulse Oximetry BMI (Body Mass Index)          68 97.9  F (36.6  C) (Tympanic) 98% 21.61 kg/m2         Blood Pressure from Last 3 Encounters:   06/25/18 164/72   09/06/17 107/82   08/28/17 120/70    Weight from Last 3 Encounters:   06/25/18 122 lb (55.3 kg)   08/28/17 118 lb (53.5 kg)   08/07/17 119 lb (54 kg)              We Performed the Following     CBC with platelets     Comprehensive metabolic panel     Hemoglobin A1c     Lipid panel reflex to direct LDL Non-fasting     XR Pelvis and Hip Right 1 View        Primary Care Provider Office Phone # Fax #    Christiano Carvalho [phone] [address] [facility] [address]        Equal Access to Services     [facility] Center[facility]ARAH : Hadii aad ku hadasho Soomaali, waaxda luqadaha, qaybta kaalmada adeegyada, waxay juniein hayaan marguerite ayala . So [facility] [phone].    ATENCIÓN: Si habla español, tiene a rubalcava disposición servicios gratuitos de asistencia lingüística. Llame al [phone].    We comply with applicable federal civil rights laws and [place] laws. We do not discriminate on the basis of race, color, national origin, age, disability, sex, sexual orientation, or gender identity.            Thank you!     Thank you for choosing [facility]  for your care. Our goal is always to provide you with excellent care. Hearing back from our patients is one way we can continue to improve our services. Please take a few minutes to complete the written survey that you may receive in the mail after your visit with us. Thank you!             Your Updated Medication List - Protect others around you: Learn how to safely use, store and throw away your medicines at www.disposemymeds.org.          This list is accurate as of 6/25/18 11:30 AM.  Always use your most recent med list.                   Brand  Name Dispense Instructions for use Diagnosis    amLODIPine 10 MG tablet    NORVASC    90 tablet    TAKE 1 TABLET (10 MG) BY MOUTH DAILY    Hypertension goal BP (blood pressure) < 140/90       aspirin 81 MG tablet      Take 1 tablet by mouth daily.        Bisoprolol 10 MG Tabs tablet    ZEBETA    180 tablet    TAKE TWO TABLETS BY MOUTH DAILY    Hypertension goal BP (blood pressure) < 140/90       * blood glucose monitoring test strip    ONETOUCH ULTRA    200 strip    Use to test blood sugars 6 to 8 times daily or as directed.    Type 1 diabetes, HbA1c goal < 8% (H)       * blood glucose monitoring test strip    ONETOUCH ULTRA    400 strip    Pt tests 4 x / day    Type 2 diabetes, HbA1C goal < 8% (H)       * ONETOUCH ULTRA test strip   Generic drug:  blood glucose monitoring     400 strip    TEST 4 TIMES DAILY    Type 2 diabetes, HbA1C goal < 8% (H)       CENTRUM SILVER PO      Take 1 tablet by mouth.        CITRACAL + D PO      Take 2 tablets by mouth daily 1200 calcium and 1000 iu vit d        * insulin NPH-insulin regular injection    HumuLIN MIX 70/30/NovoLIN MIX 70/30     Inject 4 Units Subcutaneous See Admin Instructions evening        * insulin NPH-insulin regular injection    HumuLIN MIX 70/30/NovoLIN MIX 70/30     Inject 8 Units Subcutaneous every morning Patient taking 8Units at 7:30 morning and 5Units at 5:30 evening        losartan-hydrochlorothiazide 100-25 MG per tablet    HYZAAR    90 tablet    TAKE ONE TABLET BY MOUTH ONE TIME DAILY    Hypertension goal BP (blood pressure) < 140/90       nitroGLYcerin 0.4 MG sublingual tablet    NITROSTAT    25 tablet    Place 1 tablet (0.4 mg) under the tongue every 5 minutes as needed for chest pain    Angina effort (H)       potassium chloride SA 10 MEQ CR tablet    K-DUR/KLOR-CON M    45 tablet    Take 1 tablet (10 mEq) by mouth every other day    Hypokalemia       pravastatin 40 MG tablet    PRAVACHOL    90 tablet    TAKE 1 TABLET (40 MG) BY MOUTH DAILY     Hyperlipidemia LDL goal <100       * Notice:  This list has 5 medication(s) that are the same as other medications prescribed for you. Read the directions carefully, and ask your doctor or other care provider to review them with you.

## 2018-06-25 NOTE — LETTER
June 26, 2018      Chapis Riggs  [address]  [facility] [address]        Dear Chapis,    We are writing to inform you of your test results.    - Your Cholesterol is normal.  - Your metabolic panel shows:  normal electrolytes (sodium, potassium, calcium) decreased but stable kidney function (creatinine and GFR) normal liver function (AST/ALT)  - Your A1c is improved from your last test and within goal (< 8.0%)  - Your Blood Count Results show normal White Blood Cell count (no sign of infection), normal Hemoglobin (not anemia), and normal Platelets (affects clotting).    Results for orders placed or performed in visit on 06/25/18   Hemoglobin A1c   Result Value Ref Range    Hemoglobin A1C 7.1 (H) 0 - 5.6 %   Comprehensive metabolic panel   Result Value Ref Range    Sodium 143 133 - 144 mmol/L    Potassium 3.9 3.4 - 5.3 mmol/L    Chloride 104 94 - 109 mmol/L    Carbon Dioxide 30 20 - 32 mmol/L    Anion Gap 9 3 - 14 mmol/L    Glucose 85 70 - 99 mg/dL    Urea Nitrogen 21 7 - 30 mg/dL    Creatinine 1.13 (H) 0.52 - 1.04 mg/dL    GFR Estimate 45 (L) >60 mL/min/1.7m2    GFR Estimate If Black 54 (L) >60 mL/min/1.7m2    Calcium 10.3 (H) 8.5 - 10.1 mg/dL    Bilirubin Total 0.4 0.2 - 1.3 mg/dL    Albumin 4.6 3.4 - 5.0 g/dL    Protein Total 8.1 6.8 - 8.8 g/dL    Alkaline Phosphatase 123 40 - 150 U/L    ALT 26 0 - 50 U/L    AST 24 0 - 45 U/L   Lipid panel reflex to direct LDL Non-fasting   Result Value Ref Range    Cholesterol 175 <200 mg/dL    Triglycerides 74 <150 mg/dL    HDL Cholesterol 71 >49 mg/dL    LDL Cholesterol Calculated 89 <100 mg/dL    Non HDL Cholesterol 104 <130 mg/dL   CBC with platelets   Result Value Ref Range    WBC 4.1 4.0 - 11.0 10e9/L    RBC Count 4.80 3.8 - 5.2 10e12/L    Hemoglobin 14.1 11.7 - 15.7 g/dL    Hematocrit 42.6 35.0 - 47.0 %    MCV 89 78 - 100 fl    MCH 29.4 26.5 - 33.0 pg    MCHC 33.1 31.5 - 36.5 g/dL    RDW 14.4 10.0 - 15.0 %    Platelet Count 201 150 - 450 10e9/L       Thank  you for choosing [facility].  We appreciate the opportunity to serve you and look forward to supporting your healthcare needs in the future.    If you have any questions or concerns, please call me or my staff at [phone].      Sincerely,        Uma Molina PA-C

## 2018-06-26 LAB
ALBUMIN SERPL-MCNC: 4.6 G/DL (ref 3.4–5)
ALP SERPL-CCNC: 123 U/L (ref 40–150)
ALT SERPL W P-5'-P-CCNC: 26 U/L (ref 0–50)
ANION GAP SERPL CALCULATED.3IONS-SCNC: 9 MMOL/L (ref 3–14)
AST SERPL W P-5'-P-CCNC: 24 U/L (ref 0–45)
BILIRUB SERPL-MCNC: 0.4 MG/DL (ref 0.2–1.3)
BUN SERPL-MCNC: 21 MG/DL (ref 7–30)
CALCIUM SERPL-MCNC: 10.3 MG/DL (ref 8.5–10.1)
CHLORIDE SERPL-SCNC: 104 MMOL/L (ref 94–109)
CHOLEST SERPL-MCNC: 175 MG/DL
CO2 SERPL-SCNC: 30 MMOL/L (ref 20–32)
CREAT SERPL-MCNC: 1.13 MG/DL (ref 0.52–1.04)
GFR SERPL CREATININE-BSD FRML MDRD: 45 ML/MIN/1.7M2
GLUCOSE SERPL-MCNC: 85 MG/DL (ref 70–99)
HDLC SERPL-MCNC: 71 MG/DL
LDLC SERPL CALC-MCNC: 89 MG/DL
NONHDLC SERPL-MCNC: 104 MG/DL
POTASSIUM SERPL-SCNC: 3.9 MMOL/L (ref 3.4–5.3)
PROT SERPL-MCNC: 8.1 G/DL (ref 6.8–8.8)
SODIUM SERPL-SCNC: 143 MMOL/L (ref 133–144)
TRIGL SERPL-MCNC: 74 MG/DL

## 2018-06-26 NOTE — PROGRESS NOTES
Lab letter signed and printed. Message comments below:  - Your Cholesterol is normal.  - Your metabolic panel shows:  normal electrolytes (sodium, potassium, calcium) decreased but stable kidney function (creatinine and GFR) normal liver function (AST/ALT)  - Your A1c is improved from your last test and within goal (< 8.0%)  - Your Blood Count Results show normal White Blood Cell count (no sign of infection), normal Hemoglobin (not anemia), and normal Platelets (affects clotting).

## 2018-07-03 DIAGNOSIS — E87.6 HYPOKALEMIA: ICD-10-CM

## 2018-07-03 NOTE — TELEPHONE ENCOUNTER
"Requested Prescriptions   Pending Prescriptions Disp Refills     KLOR-CON 10 MEQ CR tablet [Pharmacy Med Name: KLOR-CON M10 TABLET] 45 tablet 0      Last Written Prescription Date:  4/24/17  Last Fill Quantity: 45,  # refills: 1   Last office visit: 4/4/2017 with prescribing provider:     Future Office Visit:   Next 5 appointments (look out 90 days)     Jul 23, 2018 10:00 AM CDT   PHYSICAL with Christiano Carvalho MD   [facility]    [address] [place] [id number] [phone]                  Sig: TAKE 1 TABLET (10 MEQ) BY MOUTH EVERY OTHER DAY    Potassium Supplements Protocol Passed    7/3/2018  1:23 AM       Passed - Recent (12 mo) or future (30 days) visit within the authorizing provider's specialty    Patient had office visit in the last 12 months or has a visit in the next 30 days with authorizing provider or within the authorizing provider's specialty.  See \"Patient Info\" tab in inbasket, or \"Choose Columns\" in Meds & Orders section of the refill encounter.           Passed - Patient is age 18 or older       Passed - Normal serum potassium in past 12 months    Recent Labs   Lab Test  06/25/18   1125   POTASSIUM  3.9                      "

## 2018-07-05 RX ORDER — POTASSIUM CHLORIDE 750 MG/1
TABLET, EXTENDED RELEASE ORAL
Qty: 45 TABLET | Refills: 4 | Status: SHIPPED | OUTPATIENT
Start: 2018-07-05 | End: 2019-10-03

## 2018-07-23 ENCOUNTER — OFFICE VISIT (OUTPATIENT)
Dept: FAMILY MEDICINE | Facility: CLINIC | Age: 83
End: 2018-07-23
Payer: COMMERCIAL

## 2018-07-23 VITALS
SYSTOLIC BLOOD PRESSURE: 122 MMHG | HEART RATE: 67 BPM | HEIGHT: 63 IN | BODY MASS INDEX: 20.91 KG/M2 | OXYGEN SATURATION: 99 % | WEIGHT: 118 LBS | DIASTOLIC BLOOD PRESSURE: 62 MMHG | TEMPERATURE: 97.8 F | RESPIRATION RATE: 20 BRPM

## 2018-07-23 DIAGNOSIS — I10 HYPERTENSION GOAL BP (BLOOD PRESSURE) < 140/90: ICD-10-CM

## 2018-07-23 DIAGNOSIS — E78.5 HYPERLIPIDEMIA LDL GOAL <100: ICD-10-CM

## 2018-07-23 DIAGNOSIS — E11.9 TYPE 2 DIABETES MELLITUS WITHOUT COMPLICATION, WITH LONG-TERM CURRENT USE OF INSULIN (H): ICD-10-CM

## 2018-07-23 DIAGNOSIS — Z00.00 ROUTINE GENERAL MEDICAL EXAMINATION AT A HEALTH CARE FACILITY: Primary | ICD-10-CM

## 2018-07-23 DIAGNOSIS — M79.661 PAIN OF RIGHT LOWER LEG: ICD-10-CM

## 2018-07-23 DIAGNOSIS — Z79.4 TYPE 2 DIABETES MELLITUS WITHOUT COMPLICATION, WITH LONG-TERM CURRENT USE OF INSULIN (H): ICD-10-CM

## 2018-07-23 DIAGNOSIS — R41.3 MEMORY LOSS: ICD-10-CM

## 2018-07-23 DIAGNOSIS — Z13.89 SCREENING FOR DIABETIC PERIPHERAL NEUROPATHY: ICD-10-CM

## 2018-07-23 PROCEDURE — 99397 PER PM REEVAL EST PAT 65+ YR: CPT | Performed by: INTERNAL MEDICINE

## 2018-07-23 PROCEDURE — 99213 OFFICE O/P EST LOW 20 MIN: CPT | Mod: 25 | Performed by: INTERNAL MEDICINE

## 2018-07-23 ASSESSMENT — ACTIVITIES OF DAILY LIVING (ADL): CURRENT_FUNCTION: NO ASSISTANCE NEEDED

## 2018-07-23 NOTE — PROGRESS NOTES
SUBJECTIVE:   Chapis Riggs is a 89 year old female who presents for Preventive Visit.      Are you in the first 12 months of your Medicare coverage?  No    Physical   Annual:     Getting at least 3 servings of Calcium per day:  Yes    Bi-annual eye exam:  NO    Dental care twice a year:  Yes    Sleep apnea or symptoms of sleep apnea:  None    Diet:  Low salt and Diabetic    Frequency of exercise:  None    Taking medications regularly:  Yes    Medication side effects:  None    Additional concerns today:  YES    Ability to successfully perform activities of daily living: no assistance needed    Home Safety:  No safety concerns identified    Hearing Impairment: difficulty understanding soft or whispered speech        Fall risk:     No falls within last year  COGNITIVE SCREEN  1) Repeat 3 items (Leader, Season, Table)    2) Clock draw: NORMAL  3) 3 item recall: Recalls 3 objects  Results: 3 items recalled: COGNITIVE IMPAIRMENT LESS LIKELY    Mini-CogTM Copyright S [employer]. Licensed by the author for use in [facility]; reprinted with permission (stu@[facility]). All rights reserved.        Reviewed and updated as needed this visit by clinical staff  Tobacco  Allergies  Meds  Med Hx  Surg Hx  Fam Hx  Soc Hx        Reviewed and updated as needed this visit by Provider            No flowsheet data found.                This patient is here with her daughter.              Her main complaint is ongoing right lateral lower leg pain, between the knee and the ankle, which has been going on for several weeks now.            She saw 1 of the other providers 4 weeks ago, and had hip, pelvis, and spine x-rays, showing mild arthritis in the hips.                                   This patient notes pain when she first stands on the leg in the morning, and sometimes she can have pain in the evening when she lies down.             She notes low pain tolerance.  She notes good relief with Tylenol.                     She still drives.          She has trouble if she goes to unfamiliar places, so she avoids driving unless she knows the exact route.                 She keeps track of her own insulin and her medications.  She sees endocrinology twice per year.  She saw a new provider at her visit a couple of months ago, because her longtime endocrinologist, , has transferred his practice to [place].            She would like to see him again.                                                           Her daughter is also concerned that her mother does not eat enough, especially at breakfast, and has lost a little weight, and has occasional hypoglycemic readings, such as when they have been out shopping in the morning.                        This patient states she is concerned about her glucoses, and does not like them to be over 200.                                                                                                                                                                 Today's PHQ-2 Score:   PHQ-2 ( 1999 Pfizer) 7/23/2018   Q1: Little interest or pleasure in doing things 0   Q2: Feeling down, depressed or hopeless 0   PHQ-2 Score 0       Do you feel safe in your environment - Yes    Do you have a Health Care Directive?: Yes: Patient states has Advance Directive and will bring in a copy to clinic.    Current providers sharing in care for this patient include:   Patient Care Team:  Christiano Carvalho MD as PCP - General (Internal Medicine)    The following health maintenance items are reviewed in Epic and correct as of today:  Health Maintenance   Topic Date Due     JOYCE QUESTIONNAIRE 1 YEAR  03/02/1941     PHQ-9 Q1YR  03/02/1941     EYE EXAM Q1 YEAR  10/30/2014     MICROALBUMIN Q1 YEAR  10/30/2014     FOOT EXAM Q1 YEAR  09/14/2016     FALL RISK ASSESSMENT  01/25/2017     TETANUS IMMUNIZATION (SYSTEM ASSIGNED)  10/24/2017     INFLUENZA VACCINE (1) 09/01/2018     A1C Q6 MO  12/25/2018     TSH W/ FREE T4  REFLEX Q2 YEAR  03/29/2019     CREATININE Q1 YEAR  06/25/2019     LIPID MONITORING Q1 YEAR  06/25/2019     ADVANCE DIRECTIVE PLANNING Q5 YRS  11/03/2020     PNEUMOCOCCAL  Completed     Patient Active Problem List    Diagnosis Date Noted     CAD (coronary artery disease) 03/24/2014     Priority: High     Mild per 2009 evaluation       History of colonic polyps 01/30/2014     Priority: High     2004; a 2.5 cm polyp at 25 cm removed by Dr. Fabian; a full colonoscopy was done.                  In 7/09, he was not able to do a full colonoscopy as pt did not tolerate the exam.    Neg to mid-transverse colon.        In 9/17, flex sig neg to 45 cm           Type 2 diabetes mellitus without complication (H) 01/28/2014     Priority: High     Managed by ;        Dx approx 2003.   Rx insulin from the beginning.                               A1C 8.0 in 5/14; TSH  nml       Other and unspecified angina pectoris 10/26/2012     Priority: High     2009; nuc med adenosine stress suggests small area of anterior ischemia; medical Rx advised.     Stress echo 10/12 showed some LVH, nml EF, no wall motion abnormalities with exercise.                          Uses NTG rarely.          Normal ECG in 3/14       Hypertension goal BP (blood pressure) < 140/90 10/26/2012     Priority: High     Memory loss 10/26/2012     Priority: High     Not apparent in 1/14       Hyperlipidemia LDL goal <100 10/25/2012     Priority: High     Chronic midline low back pain without sciatica 08/28/2017     Priority: Medium     Epistaxis 11/28/2016     Priority: Medium     Cough 08/25/2014     Priority: Medium     CXR neg in 11/15 and 5/16       Ganglion cyst 03/24/2014     Priority: Medium     L hand and wrist       Arthritis 01/29/2014     Priority: Medium     Sx of; takes aleve rarely   ;       See imaging 2018; both hips, and lumbar spine       Diverticulitis of colon 10/26/2012     Priority: Medium     Problem list name updated by automated process.  Provider to review       ACP (advance care planning) 2015     Priority: Low     Advance Care Planning 11/3/2015: ACP Review and Resources Provided:  Reviewed chart for advance care plan.  Chapis Riggs has no plan or code status on file. Discussed available resources and provided with information. Added by Su Francsi             Preventive measure 2014     Priority: Low     Colonoscopy only to mid-transverse colon in   Mammogram , neg;breast exam 10/16;                 Pap smear   DXA ; results???                                     Pre-visit report from [facility] reviewed       Past Surgical History:   Procedure Laterality Date     ANAL PAPILLA      PROLAPSED     BREAST SURGERY      BIOPSY     GYN SURGERY      HYSTEROSCOPY/TUBAL     REPAIR PTOSIS BILATERAL  2011    Procedure:REPAIR PTOSIS BILATERAL; BILATERAL UPPER LID PTOSIS REPAIR; Surgeon:SALINAS DUNN; Location:[facility]     SIGMOIDOSCOPY FLEXIBLE N/A 2017    Procedure: SIGMOIDOSCOPY FLEXIBLE;  SIGMOIDOSCOPY FLEXIBLE;  Surgeon: Thang Begum MD;  Location:  GI     Social History     Social History     Marital status:      Spouse name: N/A     Number of children: 5     Years of education: N/A     Occupational History      Retired     Social History Main Topics     Smoking status: Never Smoker     Smokeless tobacco: Never Used     Alcohol use No     Drug use: No     Sexual activity: No     Other Topics Concern     Parent/Sibling W/ Cabg, Mi Or Angioplasty Before 65f 55m? No     Social History Narrative    1 son is      Immunization History   Administered Date(s) Administered     Influenza (High Dose) 3 valent vaccine 2012, 2013, 10/27/2014, 10/24/2016     Influenza (IIV3) PF 10/27/2008, 2010, 10/14/2010     Pneumo Conj 13-V (2010&after) 2015     Pneumococcal 23 valent 2008     TD (ADULT, 7+) 10/24/2007       BP Readings from Last 3 Encounters:   18 122/62    06/25/18 164/72   09/06/17 107/82    Wt Readings from Last 3 Encounters:   07/23/18 118 lb (53.5 kg)   06/25/18 122 lb (55.3 kg)   08/28/17 118 lb (53.5 kg)                  Current Outpatient Prescriptions   Medication Sig Dispense Refill     amLODIPine (NORVASC) 10 MG tablet TAKE 1 TABLET (10 MG) BY MOUTH DAILY 90 tablet 3     aspirin 81 MG tablet Take 1 tablet by mouth daily.       Bisoprolol Fumarate 10 MG TABS TAKE TWO TABLETS BY MOUTH DAILY 180 tablet 2     blood glucose (ONE TOUCH ULTRA) test strip Pt tests 4 x / day 400 strip 1     Calcium Citrate-Vitamin D (CITRACAL + D PO) Take 2 tablets by mouth daily 1200 calcium and 1000 iu vit d       glucose blood VI test strips (ONE TOUCH ULTRA TEST) strip Use to test blood sugars 6 to 8 times daily or as directed. 200 strip 9     insulin NPH-insulin regular (HUMULIN MIX 70/30, NOVOLIN MIX 70/30) SUSP 100 UNIT/ML Inject 4 Units Subcutaneous See Admin Instructions evening       insulin NPH-insulin regular (NOVOLIN 70/30) (70-30) 100 UNIT/ML injection Inject 8 Units Subcutaneous every morning Patient taking 8Units at 7:30 morning and 5Units at 5:30 evening       KLOR-CON 10 MEQ CR tablet TAKE 1 TABLET (10 MEQ) BY MOUTH EVERY OTHER DAY 45 tablet 4     losartan-hydrochlorothiazide (HYZAAR) 100-25 MG per tablet TAKE ONE TABLET BY MOUTH ONE TIME DAILY 90 tablet 0     Multiple Vitamins-Minerals (CENTRUM SILVER PO) Take 1 tablet by mouth.       nitroGLYcerin (NITROSTAT) 0.4 MG sublingual tablet Place 1 tablet (0.4 mg) under the tongue every 5 minutes as needed for chest pain 25 tablet 3     ONE TOUCH ULTRA test strip TEST 4 TIMES DAILY 400 strip 1     pravastatin (PRAVACHOL) 40 MG tablet TAKE 1 TABLET (40 MG) BY MOUTH DAILY 90 tablet 2     Allergies   Allergen Reactions     Atorvastatin      Dizzy and weak     Lisinopril-Hctz Cough     Sulfa Drugs            Review of Systems  CONSTITUTIONAL:NEGATIVE  for chills and fever   INTEGUMENTARY/SKIN: NEGATIVE for worrisome  "rashes, moles or lesions  EYES: NEGATIVE for vision changes or irritation  ENT/MOUTH: NEGATIVE for ear, mouth and throat problems  RESP: NEGATIVE for significant cough or SOB  BREAST: NEGATIVE for masses, tenderness or discharge  CV: NEGATIVE for chest pain, palpitations or peripheral edema  GI: She has occasional constipation and NEGATIVE for hematochezia and melena  : NEGATIVE for frequency, dysuria, or hematuria  NEURO: NEGATIVE for weakness, dizziness or paresthesias  ENDOCRINE: NEGATIVE for temperature intolerance, skin/hair changes  HEME: NEGATIVE for bleeding problems  PSYCHIATRIC: NEGATIVE for changes in mood or affect  Wt Readings from Last 4 Encounters:   07/23/18 118 lb (53.5 kg)   06/25/18 122 lb (55.3 kg)   08/28/17 118 lb (53.5 kg)   08/07/17 119 lb (54 kg)       OBJECTIVE:   /62 (BP Location: Left arm, Patient Position: Chair, Cuff Size: Adult Regular)  Pulse 67  Temp 97.8  F (36.6  C)  Resp 20  Ht 5' 3\" (1.6 m)  Wt 118 lb (53.5 kg)  SpO2 99%  Breastfeeding? No  BMI 20.9 kg/m2 Estimated body mass index is 20.9 kg/(m^2) as calculated from the following:    Height as of this encounter: 5' 3\" (1.6 m).    Weight as of this encounter: 118 lb (53.5 kg).  Physical Exam  GENERAL APPEARANCE: alert, no distress, elderly and frail  EYES: Eyes grossly normal to inspection  HENT: ear canals and TM's normal, nose and mouth without ulcers or lesions, oropharynx clear and oral mucous membranes moist  NECK: no adenopathy, no asymmetry, masses, or scars and thyroid normal to palpation  RESP: lungs clear to auscultation - no rales, rhonchi or wheezes  BREAST: normal without masses, tenderness or nipple discharge and no palpable axillary masses or adenopathy  CV: regular rate and rhythm, normal S1 S2, no S3 or S4, no murmur, click or rub, no peripheral edema and peripheral pulses strong  ABDOMEN: soft, nontender, no hepatosplenomegaly and no masses  MS: spine/back exam reveals ROM is reduced  SKIN: no " suspicious lesions or rashes  NEURO: Normal strength and tone, speech normal and decreased right knee reflex compared to the left.  PSYCH: affect normal/bright and speech is somewhat repetitive, with some impairment in memory details    Diagnostic Test Results:  Recent Results (from the past 744 hour(s))   XR Pelvis and Hip Right 1 View    Narrative    XR PELVIS AND HIP RIGHT 1 VIEW 6/25/2018 11:13 AM    COMPARISON: None.    HISTORY: Right hip pain.      Impression    IMPRESSION: Moderate right and mild left hip osteoarthritis. No  fractures are seen.    GUY MEYERS MD   XR Lumbar Spine 2/3 Views    Narrative    XR LUMBAR SPINE 2-3 VIEWS 6/25/2018 11:13 AM    COMPARISON: 8/28/2017    HISTORY: Right hip pain.      Impression    IMPRESSION: Vertebral heights are preserved without evidence of  fracture. Moderate disc space loss at L2-3, L3-4, L4-5 and L5-S1.  Trace anterolisthesis at L4-5, unchanged.    GUY MEYERS MD       Recent Results (from the past 3000 hour(s))   Hemoglobin A1c    Collection Time: 06/25/18 11:25 AM   Result Value Ref Range    Hemoglobin A1C 7.1 (H) 0 - 5.6 %   Comprehensive metabolic panel    Collection Time: 06/25/18 11:25 AM   Result Value Ref Range    Sodium 143 133 - 144 mmol/L    Potassium 3.9 3.4 - 5.3 mmol/L    Chloride 104 94 - 109 mmol/L    Carbon Dioxide 30 20 - 32 mmol/L    Anion Gap 9 3 - 14 mmol/L    Glucose 85 70 - 99 mg/dL    Urea Nitrogen 21 7 - 30 mg/dL    Creatinine 1.13 (H) 0.52 - 1.04 mg/dL    GFR Estimate 45 (L) >60 mL/min/1.7m2    GFR Estimate If Black 54 (L) >60 mL/min/1.7m2    Calcium 10.3 (H) 8.5 - 10.1 mg/dL    Bilirubin Total 0.4 0.2 - 1.3 mg/dL    Albumin 4.6 3.4 - 5.0 g/dL    Protein Total 8.1 6.8 - 8.8 g/dL    Alkaline Phosphatase 123 40 - 150 U/L    ALT 26 0 - 50 U/L    AST 24 0 - 45 U/L   Lipid panel reflex to direct LDL Non-fasting    Collection Time: 06/25/18 11:25 AM   Result Value Ref Range    Cholesterol 175 <200 mg/dL    Triglycerides 74 <150 mg/dL    HDL  "Cholesterol 71 >49 mg/dL    LDL Cholesterol Calculated 89 <100 mg/dL    Non HDL Cholesterol 104 <130 mg/dL   CBC with platelets    Collection Time: 06/25/18 11:25 AM   Result Value Ref Range    WBC 4.1 4.0 - 11.0 10e9/L    RBC Count 4.80 3.8 - 5.2 10e12/L    Hemoglobin 14.1 11.7 - 15.7 g/dL    Hematocrit 42.6 35.0 - 47.0 %    MCV 89 78 - 100 fl    MCH 29.4 26.5 - 33.0 pg    MCHC 33.1 31.5 - 36.5 g/dL    RDW 14.4 10.0 - 15.0 %    Platelet Count 201 150 - 450 10e9/L         ASSESSMENT / PLAN:   Chapis was seen today for physical.    Diagnoses and all orders for this visit:    Routine general medical examination at a health care facility    Pain of right lower leg    Hypertension goal BP (blood pressure) < 140/90    Hyperlipidemia LDL goal <100    Type 2 diabetes mellitus without complication, with long-term current use of insulin (H)  -     ENDOCRINOLOGY ADULT REFERRAL    Memory loss    Screening for diabetic peripheral neuropathy  -     FOOT EXAM  NO CHARGE [71075.114]     Summary and implications:  We reviewed her situation at length.  We spent additional time reviewing her right lateral lower leg pain, and the differential diagnosis of this.                 She has asymmetric knee reflexes, and I suspect a right lumbar radiculopathy, L4-5.                                 We reviewed the options including an MRI of the lumbar spine, physical therapy referral, orthopedic referral, or observation.                           Patient Instructions    Go ahead and take the Tylenol, up to 4 tablets or capsules per day.                 We can do an MRI of your low back looking for a \"pinched nerve\" in your lower back.                                                                                                                                                You need to increase your calorie intake,especially in the mornings.                  You are planning to see Dr Santoro in the future; call 2 months early for an " "appointment.                                                                     End of Life Planning:  Patient currently has an advanced directive: Yes.  Practitioner is supportive of decision.    COUNSELING:  Reviewed preventive health counseling, as reflected in patient instructions  Special attention given to:       Regular exercise       Healthy diet/nutrition    BP Readings from Last 1 Encounters:   07/23/18 122/62     Estimated body mass index is 20.9 kg/(m^2) as calculated from the following:    Height as of this encounter: 5' 3\" (1.6 m).    Weight as of this encounter: 118 lb (53.5 kg).           reports that she has never smoked. She has never used smokeless tobacco.      Appropriate preventive services were discussed with this patient, including applicable screening as appropriate for cardiovascular disease, diabetes, osteopenia/osteoporosis, and glaucoma.  As appropriate for age/gender, discussed screening for colorectal cancer, prostate cancer, breast cancer, and cervical cancer. Checklist reviewing preventive services available has been given to the patient.    Reviewed patients plan of care and provided an AVS. The Basic Care Plan (routine screening as documented in Health Maintenance) for Chapis meets the Care Plan requirement. This Care Plan has been established and reviewed with the Patient.    Counseling Resources:  ATP IV Guidelines  Pooled Cohorts Equation Calculator  Breast Cancer Risk Calculator  FRAX Risk Assessment  ICSI Preventive Guidelines  Dietary Guidelines for Americans, 2010  [employer]'s MyPlate  ASA Prophylaxis  Lung CA Screening    Christiano Carvalho MD  [facility]  "

## 2018-07-23 NOTE — PATIENT INSTRUCTIONS
" Go ahead and take the Tylenol, up to 4 tablets or capsules per day.                 We can do an MRI of your low back looking for a \"pinched nerve\" in your lower back.                                                                                                                                                You need to increase your calorie intake,especially in the mornings.                  You are planning to see Dr Santoro in the future; call 2 months early for an appointment.                                                           "

## 2018-07-23 NOTE — PROGRESS NOTES
SUBJECTIVE:   Chapis Riggs is a 89 year old female who presents for Preventive Visit.      Are you in the first 12 months of your Medicare coverage?  No    HPI      Fall risk:     No falls and/or injuries related in last year  COGNITIVE SCREEN  1) Repeat 3 items (Leader, Season, Table)    2) Clock draw: NORMAL  3) 3 item recall: Recalls 3 objects  Results: 3 items recalled: COGNITIVE IMPAIRMENT LESS LIKELY    Mini-CogTM Copyright S Val[employer]. Licensed by the author for use in [facility]; reprinted with permission (stu@[facility]). All rights reserved.        Reviewed and updated as needed this visit by clinical staff  Allergies         Reviewed and updated as needed this visit by Provider        Social History   Substance Use Topics     Smoking status: Never Smoker     Smokeless tobacco: Never Used     Alcohol use No       No flowsheet data found.            Today's PHQ-2 Score:   PHQ-2 ( 1999 Pfizer) 8/28/2017   Q1: Little interest or pleasure in doing things 0   Q2: Feeling down, depressed or hopeless 0   PHQ-2 Score 0       Do you feel safe in your environment - Yes    Do you have a Health Care Directive?: Yes: Patient states has Advance Directive and will bring in a copy to clinic.    Current providers sharing in care for this patient include:   Patient Care Team:  Christiano Carvalho MD as PCP - General (Internal Medicine)    The following health maintenance items are reviewed in Epic and correct as of today:  Health Maintenance   Topic Date Due     JOYCE QUESTIONNAIRE 1 YEAR  03/02/1941     PHQ-9 Q1YR  03/02/1941     EYE EXAM Q1 YEAR  10/30/2014     MICROALBUMIN Q1 YEAR  10/30/2014     FOOT EXAM Q1 YEAR  09/14/2016     FALL RISK ASSESSMENT  01/25/2017     TETANUS IMMUNIZATION (SYSTEM ASSIGNED)  10/24/2017     INFLUENZA VACCINE (1) 09/01/2018     A1C Q6 MO  12/25/2018     TSH W/ FREE T4 REFLEX Q2 YEAR  03/29/2019     CREATININE Q1 YEAR  06/25/2019     LIPID MONITORING Q1 YEAR  06/25/2019     ADVANCE  "DIRECTIVE PLANNING Q5 YRS  2020     PNEUMOCOCCAL  Completed     {Chronicprobdata (Optional):791540}    {Decision Support (Optional):824622}    Review of Systems  {ROS COMP (Optional):196846}    OBJECTIVE:   There were no vitals taken for this visit. Estimated body mass index is 21.61 kg/(m^2) as calculated from the following:    Height as of 17: 5' 3\" (1.6 m).    Weight as of 18: 122 lb (55.3 kg).  Physical Exam  {Exam (Optional) :679051}    {Diagnostic Test Results (Optional):194109::\"Diagnostic Test Results:\",\"none \"}    ASSESSMENT / PLAN:   {Diag Picklist:017940}    End of Life Planning:  Patient currently has an advanced directive: { :330579}    COUNSELING:  {Medicare Counselin}    BP Readings from Last 1 Encounters:   18 164/72     Estimated body mass index is 21.61 kg/(m^2) as calculated from the following:    Height as of 17: 5' 3\" (1.6 m).    Weight as of 18: 122 lb (55.3 kg).    {BP Counseling- Complete if BP >= 120/80  (Optional):555094}  {Weight Management Plan (ACO) Complete if BMI is abnormal-  Ages 18-64  BMI >24.9.  Age 65+ with BMI <23 or >30 (Optional):188781}     reports that she has never smoked. She has never used smokeless tobacco.  {Tobacco Cessation -- Complete if patient is a smoker (Optional):216690}    Appropriate preventive services were discussed with this patient, including applicable screening as appropriate for cardiovascular disease, diabetes, osteopenia/osteoporosis, and glaucoma.  As appropriate for age/gender, discussed screening for colorectal cancer, prostate cancer, breast cancer, and cervical cancer. Checklist reviewing preventive services available has been given to the patient.    Reviewed patients plan of care and provided an AVS. The {CarePlan:142300} for Chapis meets the Care Plan requirement. This Care Plan has been established and reviewed with the {PATIENT, FAMILY MEMBER, CAREGIVER:713283}.    Counseling Resources:  ATP IV " Guidelines  Pooled Cohorts Equation Calculator  Breast Cancer Risk Calculator  FRAX Risk Assessment  ICSI Preventive Guidelines  Dietary Guidelines for Americans, 2010  [employer]'s MyPlate  ASA Prophylaxis  Lung CA Screening    Christiano Carvalho MD  [facility]

## 2018-07-23 NOTE — MR AVS SNAPSHOT
"              After Visit Summary   7/23/2018    Chapis Riggs    MRN: [id number]           Patient Information     Date Of Birth          3/2/1923        Visit Information        Provider Department      7/23/2018 10:00 AM Christiano Carvalho MD [facility]        Today's Diagnoses     Routine general medical examination at a health care facility    -  1    Pain of right lower leg        Hypertension goal BP (blood pressure) < 140/90        Hyperlipidemia LDL goal <100        Type 2 diabetes mellitus without complication, with long-term current use of insulin (H)        Memory loss        Screening for diabetic peripheral neuropathy          Care Instructions     Go ahead and take the Tylenol, up to 4 tablets or capsules per day.                 We can do an MRI of your low back looking for a \"pinched nerve\" in your lower back.                                                                                                                                                You need to increase your calorie intake,especially in the mornings.                  You are planning to see Dr Santoro in the future; call 2 months early for an appointment.                                                                   Follow-ups after your visit        Additional Services     ENDOCRINOLOGY ADULT REFERRAL       Your provider has referred you to: G:  [facility]  [phone]  [email or web address]         This patient would like to resume care with Dr Santoro.       She will call for an appointment.                  Who to contact     If you have questions or need follow up information about today's clinic visit or your schedule please contact [facility] directly at [phone].  Normal or non-critical lab and imaging results will be communicated to you by MyChart, letter or " "phone within 4 business days after the clinic has received the results. If you do not hear from us within 7 days, please contact the clinic through [employer] or phone. If you have a critical or abnormal lab result, we will notify you by phone as soon as possible.  Submit refill requests through [employer] or call your pharmacy and they will forward the refill request to us. Please allow 3 business days for your refill to be completed.          Additional Information About Your Visit        Care EveryWhere ID     This is your Care EveryWhere ID. This could be used by other organizations to access your [place] medical records  [id number]        Your Vitals Were     Pulse Temperature Respirations Height Pulse Oximetry Breastfeeding?    67 97.8  F (36.6  C) 20 5' 3\" (1.6 m) 99% No    BMI (Body Mass Index)                   20.9 kg/m2            Blood Pressure from Last 3 Encounters:   07/23/18 122/62   06/25/18 164/72   09/06/17 107/82    Weight from Last 3 Encounters:   07/23/18 118 lb (53.5 kg)   06/25/18 122 lb (55.3 kg)   08/28/17 118 lb (53.5 kg)              We Performed the Following     ENDOCRINOLOGY ADULT REFERRAL     FOOT EXAM  NO CHARGE [44000.114]          Today's Medication Changes          These changes are accurate as of 7/23/18 10:44 AM.  If you have any questions, ask your nurse or doctor.               These medicines have changed or have updated prescriptions.        Dose/Directions    KLOR-CON 10 MEQ CR tablet   This may have changed:  Another medication with the same name was removed. Continue taking this medication, and follow the directions you see here.   Used for:  Hypokalemia   Generic drug:  potassium chloride SA   Changed by:  Christiano Carvalho MD        TAKE 1 TABLET (10 MEQ) BY MOUTH EVERY OTHER DAY   Quantity:  45 tablet   Refills:  4                Primary Care Provider Office Phone # Fax #    Christiano Carvalho [phone] [address] [facility] [address]   "      Equal Access to Services     [facility] CenterSARAH : Hadii aad ku hadmirandaandrey Solaithali, waaxda luqadaha, qaybta kaalmada bill, jc jones. So [facility] [phone].    ATENCIÓN: Si habla español, tiene a rubalcava disposición servicios gratuitos de asistencia lingüística. Hamidaame al [phone].    We comply with applicable federal civil rights laws and [place] laws. We do not discriminate on the basis of race, color, national origin, age, disability, sex, sexual orientation, or gender identity.            Thank you!     Thank you for choosing [facility]  for your care. Our goal is always to provide you with excellent care. Hearing back from our patients is one way we can continue to improve our services. Please take a few minutes to complete the written survey that you may receive in the mail after your visit with us. Thank you!             Your Updated Medication List - Protect others around you: Learn how to safely use, store and throw away your medicines at www.disposemymeds.org.          This list is accurate as of 7/23/18 10:44 AM.  Always use your most recent med list.                   Brand Name Dispense Instructions for use Diagnosis    amLODIPine 10 MG tablet    NORVASC    90 tablet    TAKE 1 TABLET (10 MG) BY MOUTH DAILY    Hypertension goal BP (blood pressure) < 140/90       aspirin 81 MG tablet      Take 1 tablet by mouth daily.        Bisoprolol 10 MG Tabs tablet    ZEBETA    180 tablet    TAKE TWO TABLETS BY MOUTH DAILY    Hypertension goal BP (blood pressure) < 140/90       * blood glucose monitoring test strip    ONETOUCH ULTRA    200 strip    Use to test blood sugars 6 to 8 times daily or as directed.    Type 1 diabetes, HbA1c goal < 8% (H)       * blood glucose monitoring test strip    ONETOUCH ULTRA    400 strip    Pt tests 4 x / day    Type 2 diabetes, HbA1C goal < 8% (H)       * ONETOUCH ULTRA test strip   Generic drug:  blood glucose  monitoring     400 strip    TEST 4 TIMES DAILY    Type 2 diabetes, HbA1C goal < 8% (H)       CENTRUM SILVER PO      Take 1 tablet by mouth.        CITRACAL + D PO      Take 2 tablets by mouth daily 1200 calcium and 1000 iu vit d        * insulin NPH-insulin regular injection    HumuLIN MIX 70/30/NovoLIN MIX 70/30     Inject 4 Units Subcutaneous See Admin Instructions evening        * insulin NPH-insulin regular injection    HumuLIN MIX 70/30/NovoLIN MIX 70/30     Inject 8 Units Subcutaneous every morning Patient taking 8Units at 7:30 morning and 5Units at 5:30 evening        KLOR-CON 10 MEQ CR tablet   Generic drug:  potassium chloride SA     45 tablet    TAKE 1 TABLET (10 MEQ) BY MOUTH EVERY OTHER DAY    Hypokalemia       losartan-hydrochlorothiazide 100-25 MG per tablet    HYZAAR    90 tablet    TAKE ONE TABLET BY MOUTH ONE TIME DAILY    Hypertension goal BP (blood pressure) < 140/90       nitroGLYcerin 0.4 MG sublingual tablet    NITROSTAT    25 tablet    Place 1 tablet (0.4 mg) under the tongue every 5 minutes as needed for chest pain    Angina effort (H)       pravastatin 40 MG tablet    PRAVACHOL    90 tablet    TAKE 1 TABLET (40 MG) BY MOUTH DAILY    Hyperlipidemia LDL goal <100       * Notice:  This list has 5 medication(s) that are the same as other medications prescribed for you. Read the directions carefully, and ask your doctor or other care provider to review them with you.

## 2018-08-25 ENCOUNTER — OFFICE VISIT (OUTPATIENT)
Dept: FAMILY MEDICINE | Facility: CLINIC | Age: 83
End: 2018-08-25
Payer: COMMERCIAL

## 2018-08-25 VITALS
HEART RATE: 67 BPM | BODY MASS INDEX: 21.26 KG/M2 | DIASTOLIC BLOOD PRESSURE: 62 MMHG | WEIGHT: 120 LBS | RESPIRATION RATE: 16 BRPM | TEMPERATURE: 97.1 F | OXYGEN SATURATION: 99 % | SYSTOLIC BLOOD PRESSURE: 128 MMHG

## 2018-08-25 DIAGNOSIS — M67.40 GANGLION CYST: ICD-10-CM

## 2018-08-25 DIAGNOSIS — E11.9 TYPE 2 DIABETES MELLITUS WITHOUT COMPLICATION, WITH LONG-TERM CURRENT USE OF INSULIN (H): Primary | ICD-10-CM

## 2018-08-25 DIAGNOSIS — Z79.4 TYPE 2 DIABETES MELLITUS WITHOUT COMPLICATION, WITH LONG-TERM CURRENT USE OF INSULIN (H): Primary | ICD-10-CM

## 2018-08-25 PROCEDURE — 99213 OFFICE O/P EST LOW 20 MIN: CPT | Performed by: INTERNAL MEDICINE

## 2018-08-25 NOTE — PROGRESS NOTES
SUBJECTIVE:   Chapis Riggs is a 89 year old female who presents to clinic today for the following health issues:      form  Form for state of [place]   Insulin treated diabetes         She has an appt with tatiana in 11/18; Dr Santoro.                      No major hypoglycemia.               States driving ok.              No accidents.       She carries glucose tablets and juice and Glucerna with her.                                       Problem list and histories reviewed & adjusted, as indicated.  Additional history: insulin dose 8 and 6    Recent Labs   Lab Test  06/25/18   1125  04/04/17   1026 03/29/17  10/24/16   1137  01/25/16   1342  09/14/15   1102  05/06/14 08/22/13   0813   A1C  7.1*   --   7.3*   --    --    --    --   8.0*   < >  7.1*   LDL  89   --    --   91   --   102   < >   --    --   103   HDL  71   --    --    --    --   67   --    --    --   59   TRIG  74   --    --    --    --   65   --    --    --   78   ALT  26   --    --   27  25  30   < >   --    < >  24   CR  1.13*  1.14*  1.36*  1.36*  1.40*  1.30*   < >  1.28   < >  1.50*   GFRESTIMATED  45*  44*  33*  36*  35*  38*   < >  37   < >  33*   GFRESTBLACK  54*  54*  39*  44*  43*  46*   < >  42   < >  39*   POTASSIUM  3.9  4.6  4.1  3.5  4.2  4.6   < >  4.2   < >  4.6   TSH   --    --   2.78   --    --    --    --   3.34   < >   --     < > = values in this interval not displayed.      BP Readings from Last 3 Encounters:   08/25/18 128/62   07/23/18 122/62   06/25/18 164/72    Wt Readings from Last 3 Encounters:   08/25/18 120 lb (54.4 kg)   07/23/18 118 lb (53.5 kg)   06/25/18 122 lb (55.3 kg)                    Reviewed and updated as needed this visit by clinical staff       Reviewed and updated as needed this visit by Provider         ROS:  CONSTITUTIONAL:NEGATIVE for fever, chills, change in weight  NEURO: NEGATIVE for weakness, dizziness or paresthesias and POSITIVE for some memory problems  MS:has a lump medial aspect R great  toe  OBJECTIVE:                                                    /62 (Cuff Size: Adult Regular)  Pulse 67  Temp 97.1  F (36.2  C) (Tympanic)  Resp 16  Wt 120 lb (54.4 kg)  SpO2 99%  BMI 21.26 kg/m2  Body mass index is 21.26 kg/(m^2).  GENERAL APPEARANCE: healthy, alert and no distress  CV: regular rates and rhythm and normal S1 S2, no S3 or S4  NEURO: Normal strength and tone and speech normal  MS: lump medial aspect R great toe suggests a ganglion cyst  Diagnostic test results:  none      ASSESSMENT/PLAN:                                                        ICD-10-CM    1. Type 2 diabetes mellitus without complication, with long-term current use of insulin (H) E11.9     Z79.4    2. Ganglion cyst M67.40     medial aspect R great toe       Ok to continue driving at this point.           Follow up with Provider - see podiatry prn     Christiano Carvalho MD  [facility]

## 2018-08-25 NOTE — MR AVS SNAPSHOT
After Visit Summary   8/25/2018    Chapis Riggs    MRN: [id number]           Patient Information     Date Of Birth          3/2/1923        Visit Information        Provider Department      8/25/2018 8:30 AM Christiano Carvalho MD [facility]        Today's Diagnoses     Type 2 diabetes mellitus without complication, with long-term current use of insulin (H)    -  1    Ganglion cyst           Follow-ups after your visit        Your next 10 appointments already scheduled     Nov 12, 2018 10:00 AM CST   [facility]    [address] [place] [address] [phone]              Who to contact     If you have questions or need follow up information about today's clinic visit or your schedule please contact [facility] directly at [phone].  Normal or non-critical lab and imaging results will be communicated to you by MyChart, letter or phone within 4 business days after the clinic has received the results. If you do not hear from us within 7 days, please contact the clinic through MyChart or phone. If you have a critical or abnormal lab result, we will notify you by phone as soon as possible.  Submit refill requests through [employer] or call your pharmacy and they will forward the refill request to us. Please allow 3 business days for your refill to be completed.          Additional Information About Your Visit        Care EveryWhere ID     This is your Care EveryWhere ID. This could be used by other organizations to access your [place] medical records  [id number]        Your Vitals Were     Pulse Temperature Respirations Pulse Oximetry BMI (Body Mass Index)       67 97.1  F (36.2  C) (Tympanic) 16 99% 21.26 kg/m2        Blood Pressure from Last 3 Encounters:   08/25/18 128/62   07/23/18 122/62   06/25/18 164/72    Weight from Last 3  Encounters:   08/25/18 120 lb (54.4 kg)   07/23/18 118 lb (53.5 kg)   06/25/18 122 lb (55.3 kg)              Today, you had the following     No orders found for display       Primary Care Provider Office Phone # Fax #    Christiano Carvalho [phone] [address] [facility] [address]        Equal Access to Services     JESSICA STEVENS : Hadii aad ku hadasho Soomaali, waaxda luqadaha, qaybta kaalmada adeegyada, waxay idiin hayaan adeeg kharash la'zeen . So [facility] [phone].    ATENCIÓN: Si sarala espbean, tiene a rubalcava disposición servicios gratuitos de asistencia lingüística. Llame al [phone].    We comply with applicable federal civil rights laws and [place] laws. We do not discriminate on the basis of race, color, national origin, age, disability, sex, sexual orientation, or gender identity.            Thank you!     Thank you for choosing [facility]  for your care. Our goal is always to provide you with excellent care. Hearing back from our patients is one way we can continue to improve our services. Please take a few minutes to complete the written survey that you may receive in the mail after your visit with us. Thank you!             Your Updated Medication List - Protect others around you: Learn how to safely use, store and throw away your medicines at www.disposemymeds.org.          This list is accurate as of 8/25/18  9:07 AM.  Always use your most recent med list.                   Brand Name Dispense Instructions for use Diagnosis    amLODIPine 10 MG tablet    NORVASC    90 tablet    TAKE 1 TABLET (10 MG) BY MOUTH DAILY    Hypertension goal BP (blood pressure) < 140/90       aspirin 81 MG tablet      Take 1 tablet by mouth daily.        Bisoprolol 10 MG Tabs tablet    ZEBETA    180 tablet    TAKE TWO TABLETS BY MOUTH DAILY    Hypertension goal BP (blood pressure) < 140/90       * blood glucose monitoring test strip    ONETOUCH ULTRA    200  strip    Use to test blood sugars 6 to 8 times daily or as directed.    Type 1 diabetes, HbA1c goal < 8% (H)       * blood glucose monitoring test strip    ONETOUCH ULTRA    400 strip    Pt tests 4 x / day    Type 2 diabetes, HbA1C goal < 8% (H)       * ONETOUCH ULTRA test strip   Generic drug:  blood glucose monitoring     400 strip    TEST 4 TIMES DAILY    Type 2 diabetes, HbA1C goal < 8% (H)       CENTRUM SILVER PO      Take 1 tablet by mouth.        CITRACAL + D PO      Take 2 tablets by mouth daily 1200 calcium and 1000 iu vit d        * insulin NPH-insulin regular injection    HumuLIN MIX 70/30/NovoLIN MIX 70/30     Inject 4 Units Subcutaneous See Admin Instructions evening        * insulin NPH-insulin regular injection    HumuLIN MIX 70/30/NovoLIN MIX 70/30     Inject 8 Units Subcutaneous every morning Patient taking 8Units at 7:30 morning and 5Units at 5:30 evening        KLOR-CON 10 MEQ CR tablet   Generic drug:  potassium chloride SA     45 tablet    TAKE 1 TABLET (10 MEQ) BY MOUTH EVERY OTHER DAY    Hypokalemia       losartan-hydrochlorothiazide 100-25 MG per tablet    HYZAAR    90 tablet    TAKE ONE TABLET BY MOUTH ONE TIME DAILY    Hypertension goal BP (blood pressure) < 140/90       nitroGLYcerin 0.4 MG sublingual tablet    NITROSTAT    25 tablet    Place 1 tablet (0.4 mg) under the tongue every 5 minutes as needed for chest pain    Angina effort (H)       pravastatin 40 MG tablet    PRAVACHOL    90 tablet    TAKE 1 TABLET (40 MG) BY MOUTH DAILY    Hyperlipidemia LDL goal <100       * Notice:  This list has 5 medication(s) that are the same as other medications prescribed for you. Read the directions carefully, and ask your doctor or other care provider to review them with you.

## 2018-12-07 ENCOUNTER — OFFICE VISIT (OUTPATIENT)
Dept: ENDOCRINOLOGY | Facility: CLINIC | Age: 83
End: 2018-12-07
Payer: COMMERCIAL

## 2018-12-07 VITALS
HEART RATE: 70 BPM | HEIGHT: 63 IN | SYSTOLIC BLOOD PRESSURE: 136 MMHG | WEIGHT: 122.5 LBS | BODY MASS INDEX: 21.71 KG/M2 | DIASTOLIC BLOOD PRESSURE: 65 MMHG

## 2018-12-07 DIAGNOSIS — E11.9 TYPE 2 DIABETES MELLITUS WITHOUT COMPLICATION, WITH LONG-TERM CURRENT USE OF INSULIN (H): Primary | ICD-10-CM

## 2018-12-07 DIAGNOSIS — Z79.4 TYPE 2 DIABETES MELLITUS WITHOUT COMPLICATION, WITH LONG-TERM CURRENT USE OF INSULIN (H): Primary | ICD-10-CM

## 2018-12-07 LAB — HBA1C MFR BLD: 7.4 % (ref 0–5.6)

## 2018-12-07 PROCEDURE — 36415 COLL VENOUS BLD VENIPUNCTURE: CPT | Performed by: INTERNAL MEDICINE

## 2018-12-07 PROCEDURE — 80048 BASIC METABOLIC PNL TOTAL CA: CPT | Performed by: INTERNAL MEDICINE

## 2018-12-07 PROCEDURE — 99214 OFFICE O/P EST MOD 30 MIN: CPT | Performed by: INTERNAL MEDICINE

## 2018-12-07 PROCEDURE — 84443 ASSAY THYROID STIM HORMONE: CPT | Performed by: INTERNAL MEDICINE

## 2018-12-07 PROCEDURE — 83036 HEMOGLOBIN GLYCOSYLATED A1C: CPT | Performed by: INTERNAL MEDICINE

## 2018-12-07 PROCEDURE — 84460 ALANINE AMINO (ALT) (SGPT): CPT | Performed by: INTERNAL MEDICINE

## 2018-12-07 NOTE — PROGRESS NOTES
Name: Chapis Riggs is a 89 year old woman, self-referred for evaluation of diabetes mellitus.  Previously seen by me at my former clinic ([facility]), here to establish care with [facility].    Chief Complaint   Patient presents with     Diabetes     Type 2       HPI:  Recent issues:  Here for diabetes evaluation  Feeling well overall, no new health issues reported.  Medical history from patient and EPIC chart record        Diagnosis of diabetes mellitus, age 80  Details of initial evaluation, lab testing and management not available  Had used premixed insulin as [place] 70/30  Subsequent change to the traditional premixed insulin pens, had also used Nv Flexpen sscale  Previous [facility]V hgbA1c levels include:  Lab Results   Component Value Date    A1C 7.4 (H) 12/07/2018    A1C 7.1 (H) 06/25/2018    A1C 7.3 (H) 03/29/2017    A1C 8.0 (A) 05/06/2014    A1C 8.0 (A) 01/09/2014     Current DM meds:   Humulin 70/30 pen 8U QAM and 6U QPM     Using Ultra2 BG meter, tests 2-3x/day   Recent trends 91- 280 mg/dl, meter avg 184 mg/dl  Previous [facility] labs include:  Lab Results   Component Value Date    A1C 7.4 (H) 12/07/2018     12/07/2018    POTASSIUM 4.1 12/07/2018    CHLORIDE 101 12/07/2018    CO2 27 12/07/2018    ANIONGAP 10 12/07/2018     (H) 12/07/2018    BUN 28 12/07/2018    CR 1.20 (H) 12/07/2018    GFRESTIMATED 42 (L) 12/07/2018    GFRESTBLACK 50 (L) 12/07/2018    KELLI 10.3 (H) 12/07/2018    CHOL 175 06/25/2018    TRIG 74 06/25/2018    HDL 71 06/25/2018    LDL 89 06/25/2018    NHDL 104 06/25/2018    UCRR 178 10/30/2013    MICROL 17 10/30/2013    UMALCR 9.55 10/30/2013    TSH 2.95 12/07/2018     Fam Hx:   Diabetes: Mother   Thyroid dis Sister  DM Complications:   None known      , 5 children- 4 living  Sees Dr. Christiano Carvalho/[facility] for [facility] medicine evaluations.  Has also seen Dr. LINDA Simmons/[facility]    PMH/PSH:  Past Medical History:   Diagnosis Date      Achilles bursitis or tendinitis      Contact dermatitis and other eczema, due to unspecified cause      Diverticulitis of colon (without mention of hemorrhage)(562.11)      Enlargement of lymph nodes      Hypertension      Other and unspecified angina pectoris      Pain in joint, shoulder region      Type 2 diabetes, HbA1C goal < 8% (H) 2014     Vitiligo      Past Surgical History:   Procedure Laterality Date     ANAL PAPILLA      PROLAPSED     BREAST SURGERY      BIOPSY     GYN SURGERY      HYSTEROSCOPY/TUBAL     REPAIR PTOSIS BILATERAL  2011    Procedure:REPAIR PTOSIS BILATERAL; BILATERAL UPPER LID PTOSIS REPAIR; Surgeon:SALINAS DUNN; Location:[facility]     SIGMOIDOSCOPY FLEXIBLE N/A 2017    Procedure: SIGMOIDOSCOPY FLEXIBLE;  SIGMOIDOSCOPY FLEXIBLE;  Surgeon: Thang Begum MD;  Location:  GI       Family Hx:  Family History   Problem Relation Age of Onset     Diabetes Mother      Cancer Son         GI cancer; : type?      Endocrine Disease No family hx of          Social Hx:  Social History     Socioeconomic History     Marital status:      Spouse name: Not on file     Number of children: 5     Years of education: Not on file     Highest education level: Not on file   Social Needs     Financial resource strain: Not on file     Food insecurity - worry: Not on file     Food insecurity - inability: Not on file     Transportation needs - medical: Not on file     Transportation needs - non-medical: Not on file   Occupational History     Employer: RETIRED   Tobacco Use     Smoking status: Never Smoker     Smokeless tobacco: Never Used   Substance and Sexual Activity     Alcohol use: No     Drug use: No     Sexual activity: No   Other Topics Concern     Parent/sibling w/ CABG, MI or angioplasty before 65F 55M? No   Social History Narrative    1 son is ; cancer;                    3 dtrs,1 living son          MEDICATIONS:  has a current medication list which includes the  "following prescription(s): amlodipine, aspirin, calcium citrate-vitamin d, insulin nph-insulin regular, insulin nph-insulin regular, klor-con, losartan-hydrochlorothiazide, multiple vitamins-minerals, pravastatin, bisoprolol, blood glucose monitoring, blood glucose monitoring, nitroglycerin, and onetouch ultra.    ROS:     ROS: 10 point ROS neg other than the symptoms noted above in the HPI.    GENERAL: mild fatigue, wt stable; denies fevers, chills, night sweats.   HEENT: no dysphagia, odonophagia, diplopia, neck pain  THYROID:  no apparent hyper or hypothyroid symptoms  CV: no chest pain, pressure, palpitations  LUNGS: no SOB, URIBE, cough, wheezing   ABDOMEN: no diarrhea, constipation, abdominal pain  EXTREMITIES: no rashes, ulcers, edema  NEUROLOGY: numbness of left fingers; no headaches, denies changes in vision, tingling  MSK: achiness of fingers; no specific muscle weakness  SKIN: no rashes or lesions  : rare nocturia  PSYCH:  stable mood, no significant anxiety or depression  ENDOCRINE: no heat or cold intolerance    Physical Exam   VS: /65   Pulse 70   Ht 1.6 m (5' 3\")   Wt 55.6 kg (122 lb 8 oz)   BMI 21.70 kg/m    GENERAL: AXOX3, NAD, well dressed, answering questions appropriately, appears stated age.  THYROID:  normal gland, no apparent nodules or goiter  HEENT: neck non-tender, no exopthalmous, no proptosis, EOMI  CV: RRR, no rubs, gallops, no murmurs  LUNGS: CTAB, no wheezes, rales, or ronchi  ABDOMEN: soft, nontender, nondistended  EXTREMITIES: normal appearance of feet; no edema, +pedal pulses, no lesions  NEUROLOGY: CN grossly intact, no tremors  MSK: grossly intact  SKIN: areas of skin depigmentation including face, extremities; no rashes    LABS:    All pertinent notes, labs, and images personally reviewed by me.     A/P:  Encounter Diagnosis   Name Primary?     Type 2 diabetes mellitus without complication, with long-term current use of insulin (H) Yes       Comments:  Reviewed health " history and diabetes issues.    Plan:  Discussed general issues with the diabetes diagnosis and management  We discussed the hgbA1c test which reflects previous overall glucose levels or control  Discussed the importance of blood glucose (BG) testing to assess glucose trends  Provided general overview of the diabetes medication options and medication treatment plan.    Recommend:  Continue current premixed 70/30 insulin BID dose plan   Reviewed pen dosing technique including priming, subcutaneous placement, needle disposal   Encouraged her (or her pharmacy) to contact our office when ready for insulin, pen needle, test strip refills  Would not use rapid-acting insulin correction scale at this time  Goal target -175 mg/dl, hgbA1c <8%  Check labs today  Keep focus on diet, exercise, weight management.  Advise having fasting lipid panel testing and dilated eye examination, at least annually    Addressed patient questions today    Labs ordered today:   Orders Placed This Encounter   Procedures     Basic metabolic panel     Hemoglobin A1c     TSH     ALT     Radiology/Consults ordered today: None    More than 50% of the time spent with Ms. Riggs on counseling / coordinating her care.  Total appointment time was 35 minutes.    Follow-up:  6 mo.    Barrington Santoro MD  Endocrinology  [facility]  CC: Christiano Carvalho

## 2018-12-07 NOTE — LETTER
December 16, 2018      Chapis Riggs  [address]  [facility] [address]        Dear Chapis,     I enjoyed seeing you at your recent clinic visit.  Your test results are enclosed.    Results for orders placed or performed in visit on 12/07/18   Basic metabolic panel   Result Value Ref Range    Sodium 138 133 - 144 mmol/L    Potassium 4.1 3.4 - 5.3 mmol/L    Chloride 101 94 - 109 mmol/L    Carbon Dioxide 27 20 - 32 mmol/L    Anion Gap 10 3 - 14 mmol/L    Glucose 197 (H) 70 - 99 mg/dL    Urea Nitrogen 28 7 - 30 mg/dL    Creatinine 1.20 (H) 0.52 - 1.04 mg/dL    GFR Estimate 42 (L) >60 mL/min/1.7m2    GFR Estimate If Black 50 (L) >60 mL/min/1.7m2    Calcium 10.3 (H) 8.5 - 10.1 mg/dL   Hemoglobin A1c   Result Value Ref Range    Hemoglobin A1C 7.4 (H) 0 - 5.6 %   TSH   Result Value Ref Range    TSH 2.95 0.40 - 4.00 mU/L   ALT   Result Value Ref Range    ALT 23 0 - 50 U/L     I am pleased with these test results and the overall diabetes management.  I recommend continuing the current insulin treatment plan.  Contact me if questions.    Sincerely,        Barrington Santoro MD

## 2018-12-08 LAB
ALT SERPL W P-5'-P-CCNC: 23 U/L (ref 0–50)
ANION GAP SERPL CALCULATED.3IONS-SCNC: 10 MMOL/L (ref 3–14)
BUN SERPL-MCNC: 28 MG/DL (ref 7–30)
CALCIUM SERPL-MCNC: 10.3 MG/DL (ref 8.5–10.1)
CHLORIDE SERPL-SCNC: 101 MMOL/L (ref 94–109)
CO2 SERPL-SCNC: 27 MMOL/L (ref 20–32)
CREAT SERPL-MCNC: 1.2 MG/DL (ref 0.52–1.04)
GFR SERPL CREATININE-BSD FRML MDRD: 42 ML/MIN/1.7M2
GLUCOSE SERPL-MCNC: 197 MG/DL (ref 70–99)
POTASSIUM SERPL-SCNC: 4.1 MMOL/L (ref 3.4–5.3)
SODIUM SERPL-SCNC: 138 MMOL/L (ref 133–144)
TSH SERPL DL<=0.005 MIU/L-ACNC: 2.95 MU/L (ref 0.4–4)

## 2019-01-11 ENCOUNTER — TRANSFERRED RECORDS (OUTPATIENT)
Dept: HEALTH INFORMATION MANAGEMENT | Facility: CLINIC | Age: 84
End: 2019-01-11

## 2019-01-25 DIAGNOSIS — I10 HYPERTENSION GOAL BP (BLOOD PRESSURE) < 140/90: ICD-10-CM

## 2019-01-25 RX ORDER — AMLODIPINE BESYLATE 10 MG/1
TABLET ORAL
Qty: 90 TABLET | Refills: 3 | OUTPATIENT
Start: 2019-01-25

## 2019-01-25 NOTE — TELEPHONE ENCOUNTER
"AMLODIPINE BESYLATE 10 MG TAB  Last Written Prescription Date:  02/08/18  Last Fill Quantity: 90,  # refills: 3   Last office visit: 8/25/2018 with prescribing provider:  08/25/18   Future Office Visit:    Requested Prescriptions   Pending Prescriptions Disp Refills     amLODIPine (NORVASC) 10 MG tablet [Pharmacy Med Name: AMLODIPINE BESYLATE 10 MG TAB] 90 tablet 2     Sig: TAKE 1 TABLET (10 MG) BY MOUTH DAILY    Calcium Channel Blockers Protocol  Failed - 1/25/2019  1:40 PM       Failed - Normal serum creatinine on file in past 12 months    Recent Labs   Lab Test 12/07/18  1259   CR 1.20*            Passed - Blood pressure under 140/90 in past 12 months    BP Readings from Last 3 Encounters:   12/07/18 136/65   08/25/18 128/62   07/23/18 122/62                Passed - Recent (12 mo) or future (30 days) visit within the authorizing provider's specialty    Patient had office visit in the last 12 months or has a visit in the next 30 days with authorizing provider or within the authorizing provider's specialty.  See \"Patient Info\" tab in inbasket, or \"Choose Columns\" in Meds & Orders section of the refill encounter.             Passed - Medication is active on med list       Passed - Patient is age 18 or older       Passed - No active pregnancy on record       Passed - No positive pregnancy test in past 12 months          "

## 2019-01-25 NOTE — TELEPHONE ENCOUNTER
Routing refill request to provider for review/approval because:  Labs out of range:  DINORA SmithN, RN  [employer] [facility]

## 2019-01-26 RX ORDER — AMLODIPINE BESYLATE 10 MG/1
10 TABLET ORAL DAILY
Qty: 90 TABLET | Refills: 3 | Status: SHIPPED | OUTPATIENT
Start: 2019-01-26 | End: 2020-01-27

## 2019-03-25 ENCOUNTER — OFFICE VISIT (OUTPATIENT)
Dept: FAMILY MEDICINE | Facility: CLINIC | Age: 84
End: 2019-03-25
Payer: COMMERCIAL

## 2019-03-25 VITALS
TEMPERATURE: 97.9 F | HEIGHT: 63 IN | RESPIRATION RATE: 20 BRPM | DIASTOLIC BLOOD PRESSURE: 70 MMHG | SYSTOLIC BLOOD PRESSURE: 142 MMHG | OXYGEN SATURATION: 99 % | HEART RATE: 82 BPM | WEIGHT: 118 LBS | BODY MASS INDEX: 20.91 KG/M2

## 2019-03-25 DIAGNOSIS — R41.3 MEMORY LOSS: ICD-10-CM

## 2019-03-25 DIAGNOSIS — Z79.4 TYPE 2 DIABETES MELLITUS WITHOUT COMPLICATION, WITH LONG-TERM CURRENT USE OF INSULIN (H): ICD-10-CM

## 2019-03-25 DIAGNOSIS — I10 ESSENTIAL HYPERTENSION WITH GOAL BLOOD PRESSURE LESS THAN 130/80: ICD-10-CM

## 2019-03-25 DIAGNOSIS — I25.119 CORONARY ARTERY DISEASE INVOLVING NATIVE HEART WITH ANGINA PECTORIS, UNSPECIFIED VESSEL OR LESION TYPE (H): Primary | ICD-10-CM

## 2019-03-25 DIAGNOSIS — E11.9 TYPE 2 DIABETES MELLITUS WITHOUT COMPLICATION, WITH LONG-TERM CURRENT USE OF INSULIN (H): ICD-10-CM

## 2019-03-25 PROCEDURE — 99213 OFFICE O/P EST LOW 20 MIN: CPT | Performed by: INTERNAL MEDICINE

## 2019-03-25 ASSESSMENT — MIFFLIN-ST. JEOR: SCORE: 894.37

## 2019-03-25 NOTE — PROGRESS NOTES
SUBJECTIVE:   Chapis Riggs is a 89 year old female who presents to clinic today for the following health issues:      Needs [employer] and [employer] form today.    Here with her dtr.            They want both forms for disability parking, and for [employer].                             This patient still drives,but she reports only driving in good weather, and only driving in familiar areas.    She has not driven all winter, but plans to start driving again .         She has some recognized memory issues.        She is adamant that she wants to continue driving.                                                               She and her daughter state that she cannot walk 200 feet,without stopping to rest.                    She is limited by occasional angina,and dyspnea on exertion.                                      Patient Active Problem List    Diagnosis Date Noted     CAD (coronary artery disease) 03/24/2014     Priority: High     Mild per 2009 evaluation       History of colonic polyps 01/30/2014     Priority: High     2004; a 2.5 cm polyp at 25 cm removed by Dr. Fabian; a full colonoscopy was done.                  In 7/09, he was not able to do a full colonoscopy as pt did not tolerate the exam.    Neg to mid-transverse colon.        In 9/17, flex sig neg to 45 cm           Type 2 diabetes mellitus without complication (H) 01/28/2014     Priority: High     Managed by ;        Dx approx 2003.   Rx insulin from the beginning.                               A1C 8.0 in 5/14; TSH  nml       Other and unspecified angina pectoris 10/26/2012     Priority: High     2009; nuc med adenosine stress suggests small area of anterior ischemia; medical Rx advised.     Stress echo 10/12 showed some LVH, nml EF, no wall motion abnormalities with exercise.                          Uses NTG rarely.          Normal ECG in 3/14       Essential hypertension with goal blood pressure less than 130/80  10/26/2012     Priority: High     Memory loss 10/26/2012     Priority: High      B12 and thyroid levels normal in 2014       Hyperlipidemia LDL goal <100 10/25/2012     Priority: High     Type 2 diabetes mellitus without complication, with long-term current use of insulin (H) 08/25/2018     Priority: Medium     Pain of right lower leg 07/23/2018     Priority: Medium     Chronic midline low back pain without sciatica 08/28/2017     Priority: Medium     Epistaxis 11/28/2016     Priority: Medium     Cough 08/25/2014     Priority: Medium     CXR neg in 11/15 and 5/16       Ganglion cyst 03/24/2014     Priority: Medium     L hand and wrist               In 8/18, medial aspect R great toe       Arthritis 01/29/2014     Priority: Medium     Sx of; takes aleve rarely   ;       See imaging 2018; both hips, and lumbar spine       Diverticulitis of colon 10/26/2012     Priority: Medium     Problem list name updated by automated process. Provider to review       ACP (advance care planning) 11/03/2015     Priority: Low     Advance Care Planning 11/3/2015: ACP Review and Resources Provided:  Reviewed chart for advance care plan.  Chapis Marie Oneil has no plan or code status on file. Discussed available resources and provided with information. Added by Su Francis             Preventive measure 01/28/2014     Priority: Low     Colonoscopy only to mid-transverse colon in 7/09  Mammogram 8/12, neg;breast exam 10/16;7/18                 Pap smear 1/03  DXA 2005; results???                                     [facility] reviewed               Current Outpatient Medications   Medication Sig Dispense Refill     amLODIPine (NORVASC) 10 MG tablet Take 1 tablet (10 mg) by mouth daily 90 tablet 3     aspirin 81 MG tablet Take 1 tablet by mouth daily.       Bisoprolol (ZEBETA) 10 MG TABS tablet TAKE TWO TABLETS BY MOUTH DAILY 180 tablet 2     blood glucose (ONE TOUCH ULTRA) test strip Pt tests 4 x / day 400 strip 1      "Calcium Citrate-Vitamin D (CITRACAL + D PO) Take 2 tablets by mouth daily 1200 calcium and 1000 iu vit d       glucose blood VI test strips (ONE TOUCH ULTRA TEST) strip Use to test blood sugars 6 to 8 times daily or as directed. 200 strip 9     insulin NPH-insulin regular (NOVOLIN 70/30) (70-30) 100 UNIT/ML injection Inject 8 Units Subcutaneous every morning Patient taking 8Units at 7:30 morning and 6Units at 5:30 evening       KLOR-CON 10 MEQ CR tablet TAKE 1 TABLET (10 MEQ) BY MOUTH EVERY OTHER DAY 45 tablet 4     losartan-hydrochlorothiazide (HYZAAR) 100-25 MG per tablet Take 1 tablet by mouth daily 90 tablet 3     Multiple Vitamins-Minerals (CENTRUM SILVER PO) Take 1 tablet by mouth.       nitroGLYcerin (NITROSTAT) 0.4 MG sublingual tablet Place 1 tablet (0.4 mg) under the tongue every 5 minutes as needed for chest pain 25 tablet 3     ONE TOUCH ULTRA test strip TEST 4 TIMES DAILY 400 strip 1     pravastatin (PRAVACHOL) 40 MG tablet TAKE 1 TABLET (40 MG) BY MOUTH DAILY 90 tablet 3     Allergies   Allergen Reactions     Atorvastatin      Dizzy and weak     Lisinopril-Hctz Cough     Sulfa Drugs      BP Readings from Last 3 Encounters:   03/25/19 142/70   12/07/18 136/65   08/25/18 128/62    Wt Readings from Last 3 Encounters:   03/25/19 53.5 kg (118 lb)   12/07/18 55.6 kg (122 lb 8 oz)   08/25/18 54.4 kg (120 lb)                    Reviewed and updated as needed this visit by clinical staff  Tobacco  Allergies  Meds  Med Hx  Surg Hx  Fam Hx  Soc Hx      Reviewed and updated as needed this visit by Provider         ROS:  Constitutional, HEENT,gi and gu systems are negative, except as otherwise noted.    OBJECTIVE:                                                    /70 (BP Location: Left arm, Patient Position: Chair, Cuff Size: Adult Regular)   Pulse 82   Temp 97.9  F (36.6  C)   Resp 20   Ht 1.6 m (5' 3\")   Wt 53.5 kg (118 lb)   SpO2 99%   Breastfeeding? No   BMI 20.90 kg/m    Body mass index is " 20.9 kg/m .  GENERAL APPEARANCE: alert and no distress  RESP: no rales or rhonchi  CV: regular rates and rhythm, normal S1 S2, no S3 or S4 and no murmur, click or rub  PSYCH: tangential and talkative.       She is also very adamant that she plans to continue driving.         Her daughter does not object, as long as this patient is not driving the winter, or to unfamiliarlocations.    Diagnostic test results:  none      ASSESSMENT/PLAN:                                                        ICD-10-CM    1. Coronary artery disease involving native heart with angina pectoris, unspecified vessel or lesion type (H) I25.119    2. Type 2 diabetes mellitus without complication, with long-term current use of insulin (H) E11.9     Z79.4    3. Essential hypertension with goal blood pressure less than 130/80 I10    4. Memory loss R41.3        Summary and implications:  We reviewed multiple issues.           We reviewed all of the issues on the diagnoses list.                 I filled out the [employer] and the disability parking forms.          We reviewed that she will not be driving in the winter, or in unfamiliar locations.  Patient Instructions   You are planning to see Dr Santoro regarding your diabetes issues in May or June 2019.                      You should get an influenza vaccine (flu shot) next October or November.                   Return in about 6 months (around 9/25/2019) for BP Recheck, cardiac issues.      Christiano Carvalho MD  [facility]

## 2019-03-25 NOTE — PATIENT INSTRUCTIONS
You are planning to see Dr Santoro regarding your diabetes issues in May or June 2019.                      You should get an influenza vaccine (flu shot) next October or November.

## 2019-06-19 DIAGNOSIS — I10 HYPERTENSION GOAL BP (BLOOD PRESSURE) < 140/90: ICD-10-CM

## 2019-06-19 NOTE — TELEPHONE ENCOUNTER
"Requested Prescriptions   Pending Prescriptions Disp Refills     bisoprolol (ZEBETA) 10 MG tablet [Pharmacy Med Name: BISOPROLOL FUMARATE 10 MG TAB] 180 tablet 2     Sig: TAKE TWO TABLETS BY MOUTH DAILY        Last Written Prescription Date:  9/6/2018  Last Fill Quantity: 180,  # refills: 2   Last office visit: 3/25/2019 with prescribing provider:  Dr. Carvalho  Future Office Visit:  none       Beta-Blockers Protocol Failed - 6/19/2019  1:14 AM        Failed - Blood pressure under 140/90 in past 12 months     BP Readings from Last 3 Encounters:   03/25/19 142/70   12/07/18 136/65   08/25/18 128/62                 Passed - Patient is age 6 or older        Passed - Recent (12 mo) or future (30 days) visit within the authorizing provider's specialty     Patient had office visit in the last 12 months or has a visit in the next 30 days with authorizing provider or within the authorizing provider's specialty.  See \"Patient Info\" tab in inbasket, or \"Choose Columns\" in Meds & Orders section of the refill encounter.              Passed - Medication is active on med list        "

## 2019-06-24 RX ORDER — BISOPROLOL FUMARATE 10 MG/1
TABLET, FILM COATED ORAL
Qty: 180 TABLET | Refills: 2 | Status: SHIPPED | OUTPATIENT
Start: 2019-06-24 | End: 2020-03-09

## 2019-06-24 NOTE — TELEPHONE ENCOUNTER
Routing refill request to provider for review/approval because:  BP out of range, provider approval needed.

## 2019-07-09 ENCOUNTER — OFFICE VISIT (OUTPATIENT)
Dept: FAMILY MEDICINE | Facility: CLINIC | Age: 84
End: 2019-07-09
Payer: COMMERCIAL

## 2019-07-09 VITALS
OXYGEN SATURATION: 99 % | HEART RATE: 86 BPM | BODY MASS INDEX: 20.73 KG/M2 | TEMPERATURE: 97.5 F | SYSTOLIC BLOOD PRESSURE: 150 MMHG | RESPIRATION RATE: 20 BRPM | WEIGHT: 117 LBS | HEIGHT: 63 IN | DIASTOLIC BLOOD PRESSURE: 72 MMHG

## 2019-07-09 DIAGNOSIS — E11.9 TYPE 2 DIABETES MELLITUS WITHOUT COMPLICATION, WITH LONG-TERM CURRENT USE OF INSULIN (H): ICD-10-CM

## 2019-07-09 DIAGNOSIS — I10 ESSENTIAL HYPERTENSION WITH GOAL BLOOD PRESSURE LESS THAN 130/80: ICD-10-CM

## 2019-07-09 DIAGNOSIS — Z79.4 TYPE 2 DIABETES MELLITUS WITHOUT COMPLICATION, WITH LONG-TERM CURRENT USE OF INSULIN (H): ICD-10-CM

## 2019-07-09 DIAGNOSIS — R41.3 MEMORY LOSS: Primary | ICD-10-CM

## 2019-07-09 LAB — HBA1C MFR BLD: 7.6 % (ref 0–5.6)

## 2019-07-09 PROCEDURE — 83036 HEMOGLOBIN GLYCOSYLATED A1C: CPT | Performed by: INTERNAL MEDICINE

## 2019-07-09 PROCEDURE — 82607 VITAMIN B-12: CPT | Performed by: INTERNAL MEDICINE

## 2019-07-09 PROCEDURE — 99215 OFFICE O/P EST HI 40 MIN: CPT | Performed by: INTERNAL MEDICINE

## 2019-07-09 PROCEDURE — 80053 COMPREHEN METABOLIC PANEL: CPT | Performed by: INTERNAL MEDICINE

## 2019-07-09 PROCEDURE — 36415 COLL VENOUS BLD VENIPUNCTURE: CPT | Performed by: INTERNAL MEDICINE

## 2019-07-09 ASSESSMENT — ANXIETY QUESTIONNAIRES
3. WORRYING TOO MUCH ABOUT DIFFERENT THINGS: NOT AT ALL
7. FEELING AFRAID AS IF SOMETHING AWFUL MIGHT HAPPEN: NOT AT ALL
GAD7 TOTAL SCORE: 1
2. NOT BEING ABLE TO STOP OR CONTROL WORRYING: NOT AT ALL
GAD7 TOTAL SCORE: 1
4. TROUBLE RELAXING: NOT AT ALL
5. BEING SO RESTLESS THAT IT IS HARD TO SIT STILL: NOT AT ALL
7. FEELING AFRAID AS IF SOMETHING AWFUL MIGHT HAPPEN: NOT AT ALL
1. FEELING NERVOUS, ANXIOUS, OR ON EDGE: SEVERAL DAYS
GAD7 TOTAL SCORE: 1
6. BECOMING EASILY ANNOYED OR IRRITABLE: NOT AT ALL

## 2019-07-09 ASSESSMENT — MIFFLIN-ST. JEOR: SCORE: 889.84

## 2019-07-09 ASSESSMENT — PATIENT HEALTH QUESTIONNAIRE - PHQ9
SUM OF ALL RESPONSES TO PHQ QUESTIONS 1-9: 3
SUM OF ALL RESPONSES TO PHQ QUESTIONS 1-9: 3
10. IF YOU CHECKED OFF ANY PROBLEMS, HOW DIFFICULT HAVE THESE PROBLEMS MADE IT FOR YOU TO DO YOUR WORK, TAKE CARE OF THINGS AT HOME, OR GET ALONG WITH OTHER PEOPLE: NOT DIFFICULT AT ALL

## 2019-07-09 NOTE — LETTER
July 11, 2019      Chapis Riggs  [address]  [facility] [address]        Dear ,    We are writing to inform you of your test results.        Your diabetes control overall is considered good,especially for age 89.       The A1C of 7.6 correlates to an average blood sugar of approximately 168.                    Your lab results are either good, or else stable, including the kidney,liver,and potassium results.      Keep taking the same medications.      Results for orders placed or performed in visit on 07/09/19   Comprehensive metabolic panel (BMP + Alb, Alk Phos, ALT, AST, Total. Bili, TP)   Result Value Ref Range    Sodium 143 133 - 144 mmol/L    Potassium 3.8 3.4 - 5.3 mmol/L    Chloride 105 94 - 109 mmol/L    Carbon Dioxide 31 20 - 32 mmol/L    Anion Gap 7 3 - 14 mmol/L    Glucose 293 (H) 70 - 99 mg/dL    Urea Nitrogen 24 7 - 30 mg/dL    Creatinine 1.28 (H) 0.52 - 1.04 mg/dL    GFR Estimate 35 (L) >60 mL/min/[1.73_m2]    GFR Estimate If Black 41 (L) >60 mL/min/[1.73_m2]    Calcium 9.5 8.5 - 10.1 mg/dL    Bilirubin Total 0.3 0.2 - 1.3 mg/dL    Albumin 4.2 3.4 - 5.0 g/dL    Protein Total 7.9 6.8 - 8.8 g/dL    Alkaline Phosphatase 95 40 - 150 U/L    ALT 28 0 - 50 U/L    AST 28 0 - 45 U/L   Hemoglobin A1c   Result Value Ref Range    Hemoglobin A1C 7.6 (H) 0 - 5.6 %      Result Value Ref Range               If you have any questions or concerns, please call the clinic at the number listed above.       Sincerely,        Christiano Carvalho MD

## 2019-07-09 NOTE — PATIENT INSTRUCTIONS
You can stop taking the pravastatin, and the calcium tablets.                                                                                                                                       Your daughter will be contacted by the care coordinator.                                                                                                                                  It is time for you to move, probably to assisted living.                                                                                                                                        In the meantime, you could consider hiring help, such as calling an organization such as [employer].                                                I will let you know your lab results.

## 2019-07-09 NOTE — PROGRESS NOTES
Subjective     Chapis Riggs is a 89 year old female who presents to clinic today for the following health issues:    HPI   Diabetes Follow-up      How often are you checking your blood sugar? Four or more times daily    What time of day are you checking your blood sugars (select all that apply)?  Before meals    Have you had any blood sugars above 200?  Yes ,occ    Have you had any blood sugars below 70?  No    What symptoms do you notice when your blood sugar is low?  None    What concerns do you have today about your diabetes? Yes, BS reading greater than 180 and above 200, concerns re: eating or not     Do you have any of these symptoms? (Select all that apply)  No numbness or tingling in feet.  No redness, sores or blisters on feet.  No complaints of excessive thirst.  No reports of blurry vision.  No significant changes to weight. But having some RLE pains?     Have you had a diabetic eye exam in the last 12 months? No    BP Readings from Last 2 Encounters:   03/25/19 142/70   12/07/18 136/65     Hemoglobin A1C (%)   Date Value   12/07/2018 7.4 (H)   06/25/2018 7.1 (H)     LDL Cholesterol Calculated (mg/dL)   Date Value   06/25/2018 89   09/14/2015 102     LDL Cholesterol Direct (mg/dL)   Date Value   10/24/2016 91       Diabetes Management Resources    Amount of exercise or physical activity: None    Problems taking medications regularly: No    Medication side effects: none    Diet: low salt and diabetic          Here with her dtr.      dtr lives one hour away; a son lives in [facility].         They argue through the interview.                  Memory is worsening.             This patient according to her daughter is becoming more confused. We are not sure if she is taking her medications properly. She gets concerned when her blood sugar is over 200 and has not been eating a lot.                       She lives alone in a large home which is in need of multiple repairs and is not in a good neighborhood.  "  Her daughter wants her to move, but the patient is very reluctant.                  At times during the interview she perseverates about \"being too old\" and the trouble of moving etc. Is not worth it.                         Her daughter gives multiple examples of this patient's poor memory, including for people, for restaurants they have been to together, for activities they have done even in the recent pas etc.                      Her daughter is not sure how to encourage her mother to move out of the home.                She has not seen her endocrinologist in 8 months, and does not have an appointment for follow-up.        Her daughter wanted to review all of the medications with me, and the indications for them, etc.                         Depression Followup    How are you doing with your depression since your last visit? Worsened     Are you having other symptoms that might be associated with depression? No    Have you had a significant life event?  OTHER: not specific     Are you feeling anxious or having panic attacks?   No    Do you have any concerns with your use of alcohol or other drugs? No    Social History     Tobacco Use     Smoking status: Never Smoker     Smokeless tobacco: Never Used   Substance Use Topics     Alcohol use: No     Drug use: No     No flowsheet data found.  No flowsheet data found.  No flowsheet data found.        Current Outpatient Medications   Medication Sig Dispense Refill     amLODIPine (NORVASC) 10 MG tablet Take 1 tablet (10 mg) by mouth daily 90 tablet 3     aspirin 81 MG tablet Take 1 tablet by mouth daily.       bisoprolol (ZEBETA) 10 MG tablet TAKE TWO TABLETS BY MOUTH DAILY 180 tablet 2     blood glucose (ONE TOUCH ULTRA) test strip Pt tests 4 x / day 400 strip 1     Calcium Citrate-Vitamin D (CITRACAL + D PO) Take 2 tablets by mouth daily 1200 calcium and 1000 iu vit d       glucose blood VI test strips (ONE TOUCH ULTRA TEST) strip Use to test blood sugars 6 to 8 " times daily or as directed. 200 strip 9     insulin NPH-insulin regular (NOVOLIN 70/30) (70-30) 100 UNIT/ML injection Inject 8 Units Subcutaneous every morning Patient taking 8Units at 7:30 morning and 6Units at 5:30 evening       KLOR-CON 10 MEQ CR tablet TAKE 1 TABLET (10 MEQ) BY MOUTH EVERY OTHER DAY 45 tablet 4     losartan-hydrochlorothiazide (HYZAAR) 100-25 MG per tablet Take 1 tablet by mouth daily 90 tablet 3     Multiple Vitamins-Minerals (CENTRUM SILVER PO) Take 1 tablet by mouth.       nitroGLYcerin (NITROSTAT) 0.4 MG sublingual tablet Place 1 tablet (0.4 mg) under the tongue every 5 minutes as needed for chest pain 25 tablet 3     ONE TOUCH ULTRA test strip TEST 4 TIMES DAILY 400 strip 1     pravastatin (PRAVACHOL) 40 MG tablet TAKE 1 TABLET (40 MG) BY MOUTH DAILY 90 tablet 3     Recent Labs   Lab Test 12/07/18  1259 06/25/18  1125  03/29/17 10/24/16  1137  09/14/15  1102  08/22/13  0813   A1C 7.4* 7.1*  --  7.3*  --   --   --    < > 7.1*   LDL  --  89  --   --  91  --  102   < > 103   HDL  --  71  --   --   --   --  67  --  59   TRIG  --  74  --   --   --   --  65  --  78   ALT 23 26  --   --  27   < > 30   < > 24   CR 1.20* 1.13*   < > 1.36* 1.36*   < > 1.30*   < > 1.50*   GFRESTIMATED 42* 45*   < > 33* 36*   < > 38*   < > 33*   GFRESTBLACK 50* 54*   < > 39* 44*   < > 46*   < > 39*   POTASSIUM 4.1 3.9   < > 4.1 3.5   < > 4.6   < > 4.6   TSH 2.95  --   --  2.78  --   --   --    < >  --     < > = values in this interval not displayed.      BP Readings from Last 3 Encounters:   07/09/19 150/72   03/25/19 142/70   12/07/18 136/65    Wt Readings from Last 3 Encounters:   07/09/19 53.1 kg (117 lb)   03/25/19 53.5 kg (118 lb)   12/07/18 55.6 kg (122 lb 8 oz)                      Reviewed and updated as needed this visit by Provider         Review of Systems   ROS COMP: CONSTITUTIONAL:NEGATIVE for fever, chills, change in weight and POSITIVE  for fatigue  RESP:NEGATIVE for significant cough or SOB  CV:  "NEGATIVE for chest pain, palpitations or peripheral edema  GI: NEGATIVE for nausea, abdominal pain, heartburn, or change in bowel habits  NEURO: NEGATIVE for HX CVA and HX TIA      Objective    /72 (BP Location: Left arm, Patient Position: Chair, Cuff Size: Adult Small)   Pulse 86   Temp 97.5  F (36.4  C)   Resp 20   Ht 1.6 m (5' 3\")   Wt 53.1 kg (117 lb)   SpO2 99%   Breastfeeding? No   BMI 20.73 kg/m    Body mass index is 20.73 kg/m .  Physical Exam   GENERAL APPEARANCE: alert, no distress and Nourishment underweight   RESP: lungs clear to auscultation - no rales, rhonchi or wheezes  CV: regular rates and rhythm, normal S1 S2, no S3 or S4 and no murmur, click or rub  NEURO: Normal strength and tone, speech normal and decreased tone ; She perseverates about several issues  PSYCH: confused and tangential    Diagnostic Test Results:  Pending        Assessment & Plan     Chapis was seen today for diabetes, memory loss and depression.    Diagnoses and all orders for this visit:    Memory loss  -     CARE COORDINATION REFERRAL  -     Vitamin B12    Type 2 diabetes mellitus without complication, with long-term current use of insulin (H)  -     Comprehensive metabolic panel (BMP + Alb, Alk Phos, ALT, AST, Total. Bili, TP)  -     Hemoglobin A1c    Essential hypertension with goal blood pressure less than 130/80           Summary and implications:  We reviewed multiple issues.           We reviewed all of the issues on the diagnoses list.                 This patient is very likely developing late onset Alzheimer's dementia..                  She should not be living in her own home any longer.                 She should be in an assisted living facility, getting help with her insulin and her medications and her meals.                         I tried to simplify her medication list.           The daughter was interested in having her mother stopped taking pravastatin due to statin concerns.               Check " labs and adjust medications as indicated.                40 minute visit,over half counseling and coordination of care    Patient Instructions   You can stop taking the pravastatin, and the calcium tablets.                                                                                                                                       Your daughter will be contacted by the care coordinator.                                                                                                                                  It is time for you to move, probably to assisted living.                                                                                                                                        In the meantime, you could consider hiring help, such as calling an organization such as [employer].                                                I will let you know your lab results.                        Return in about 3 months (around 10/9/2019) for labs will be needed, memory loss follow up.    Christiano Carvalho MD  [facility]      Answers for HPI/ROS submitted by the patient on 7/9/2019   If you checked off any problems, how difficult have these problems made it for you to do your work, take care of things at home, or get along with other people?: Not difficult at all  PHQ9 TOTAL SCORE: 3  JOYCE 7 TOTAL SCORE: 1  Results for orders placed or performed in visit on 07/09/19   Comprehensive metabolic panel (BMP + Alb, Alk Phos, ALT, AST, Total. Bili, TP)   Result Value Ref Range    Sodium 143 133 - 144 mmol/L    Potassium 3.8 3.4 - 5.3 mmol/L    Chloride 105 94 - 109 mmol/L    Carbon Dioxide 31 20 - 32 mmol/L    Anion Gap 7 3 - 14 mmol/L    Glucose 293 (H) 70 - 99 mg/dL    Urea Nitrogen 24 7 - 30 mg/dL    Creatinine 1.28 (H) 0.52 - 1.04 mg/dL    GFR Estimate 35 (L) >60 mL/min/[1.73_m2]    GFR Estimate If Black 41 (L) >60 mL/min/[1.73_m2]    Calcium 9.5 8.5 - 10.1  mg/dL    Bilirubin Total 0.3 0.2 - 1.3 mg/dL    Albumin 4.2 3.4 - 5.0 g/dL    Protein Total 7.9 6.8 - 8.8 g/dL    Alkaline Phosphatase 95 40 - 150 U/L    ALT 28 0 - 50 U/L    AST 28 0 - 45 U/L   Hemoglobin A1c   Result Value Ref Range    Hemoglobin A1C 7.6 (H) 0 - 5.6 %   Vitamin B12   Result Value Ref Range    Vitamin B12 1,404 (H) 193 - 986 pg/mL     Letter sent                Your diabetes control overall is considered good,especially for age 89.       The A1C of 7.6 correlates to an average blood sugar of approximately 168.                    Your lab results are either good, or else stable, including the kidney,liver,and potassium results.      Keep taking the same medications.

## 2019-07-10 ENCOUNTER — PATIENT OUTREACH (OUTPATIENT)
Dept: CARE COORDINATION | Facility: CLINIC | Age: 84
End: 2019-07-10

## 2019-07-10 LAB — VIT B12 SERPL-MCNC: 1404 PG/ML (ref 193–986)

## 2019-07-10 ASSESSMENT — ANXIETY QUESTIONNAIRES: GAD7 TOTAL SCORE: 1

## 2019-07-10 ASSESSMENT — ACTIVITIES OF DAILY LIVING (ADL): DEPENDENT_IADLS:: INDEPENDENT

## 2019-07-10 NOTE — PROGRESS NOTES
"Clinic Care Coordination Contact    Clinic Care Coordination Contact  OUTREACH    Referral Information: Pt seen in clinic with her daughter for follow up. Pt with declining memory - concerns with medication management and consistent nutrition in home. Discussion about RASHARD, SW CC outreach requested for follow up.  Referral Source: PCP  Primary Diagnosis: Neurological Disorders, Diabetes, Depression  Chief Complaint   Patient presents with     Clinic Care Coordination - Initial     ([facility])      Universal Utilization:   Difficulty keeping appointments: No  Compliance Concerns: No  No-Show Concerns: No  No PCP office visit in Past Year: No  Utilization    Last refreshed: 7/10/2019  1:35 AM:  Hospital Admissions 0           Last refreshed: 7/10/2019  1:35 AM:  ED Visits 0           Last refreshed: 7/10/2019  1:35 AM:  No Show Count (past year) 1              Current as of: 7/10/2019  1:35 AM            Clinical Concerns:  Current Medical Concerns:  Pt with history of diabetes managed with medication. A1C around 7 over past year, multiple blood sugar readings over 200.     Current Behavioral Concerns: Pt more isolated and with active depression symptoms. Daughter reports pt's last living sibling  a year ago and does not have many living friends. Pt states ongoing that she wishes to die, \"let nature take its course\" and she often feels like a burden. Pt does not leave home much at this point and has very little social activity.     Education Provided to patient's daughter: Role of Care Coordination and community resources to support family during transition.    Pain: No  Health Maintenance Reviewed: Due/Overdue     Medication Management:  Unclear at OV if pt was administering and managing medication effectively at this time. Would likely benefit from additional support.      Functional Status:  Dependent ADLs: Independent  Dependent IADLs: Independent(pt may need more assistance with medication management " and other household tasks soon due to declining memory)  Bed or wheelchair confined: No  Mobility Status: Independent  Fallen 2 or more times in the past year?: No  Any fall with injury in the past year?: No    Overall pt doing relatively well physically for her age. Pt's daughter Anabel states that recently she did not have any concerns for IADL are ALD needs and thought pt could benefit by just moving to [facility], however, with recent decline in memory pt's daughter understanding that more care is needed and will likely increase in need over time.    Living Situation:  Current living arrangement: I live alone  Type of residence: Private home - stairs. Pt's daughter reports pt does not go into the basement of the home. Home is roughly 100 years old and in disrepair. Pt lives in unsafe neighborhood so keeps all windows closed and locked with shades drawn. Pt's daughter reports it frequently is about 80-90 degrees in the home.     Diet/Exercise/Sleep:  Some concern expressed for pt not eating much at home. Pt's daughter was not as concerned about this today during the phone call. Suggested meals on wheels as a more immediate support for pt if this concern escalated. Pt's daughter expressed understanding.    Transportation:  Transportation concerns: Yes(pt continues to drive herself, has [employer] mobility but has never used this service)  Transportation means: Regular car, [employer]     Psychosocial:  Roman Catholic or spiritual beliefs that impact treatment: No  Mental health DX: Yes  Mental health management concern: Yes - pt has ongoing depression and may benefit from therapy or other intervention. Did not discuss this during outreach today.  Informal Support system: Children     Financial/Insurance:   Financial/Insurance concerns: Yes - pt's daughter reports she is not fully aware of pt's financial situation, however, knows that pt still has a reverse mortgage on the home and owes money. Pt on fixed  income. Unsure if pt would qualify for AC or EW waiver. Reviewed that it would be beneficial to discuss finances further and have this information when looking into correction options.     Resources and Interventions:  Provided pt's daughter with contact information for [facility]. They work with seniors and families to review finances and options for transition.     Advance Care Plan/Directive  Advanced Care Plans/Directives on file: No  Advanced Care Plan/Directive Status: Considering Options     Referrals Placed: Community Resources     Goals:   Goals        General    #1. Transportation (pt-stated)     Notes - Note created  7/10/2019 10:11 AM by Leni Cota LSW    Goal Statement: I will consider using community transportation services and no longer driving myself.  Measure of Success: I will utilize [employer] mobility and other community services. I will stop driving.  Supportive Steps to Achieve: SW will provide additional resources as needed. Children will have ongoing conversation with pt regarding safety.  Barriers: Pt resistive and feels like a burden.   Strengths: Supportive family.  Date to Achieve By: 8/2019          #2. Reducing Risks (pt-stated)     Notes - Note edited  7/10/2019 10:19 AM by Leni Cota LSW    Goal Statement: I will consider alternative living situations.  Measure of Success: I will listen to information provided by children and community services and tour at least one RASHARD option.  Supportive Steps to Achieve: SW will provide referrals to community resources as needed and support to family. Family will outreach to comm  Barriers: Pt is resistive and feels like a burden.    Strengths: Supportive family.  Date to Achieve By: 9/2019              Patient/Caregiver understanding: Pt reports understanding and denies any additional questions or concerns at this times. SW CC engaged in AIDET communication during encounter.    Outreach Frequency: 2 weeks    Plan: Pt's daughter will  call [facility] for further assistance with specific financial and RASHARD support. Encouraged pt's daughter to reach out any time with questions or concerns. SW CC will outreach in 2 weeks to follow up.     XAVIER Camarillo   Social Work Care Coordinator  [phone]

## 2019-07-11 LAB
ALBUMIN SERPL-MCNC: 4.2 G/DL (ref 3.4–5)
ALP SERPL-CCNC: 95 U/L (ref 40–150)
ALT SERPL W P-5'-P-CCNC: 28 U/L (ref 0–50)
ANION GAP SERPL CALCULATED.3IONS-SCNC: 7 MMOL/L (ref 3–14)
AST SERPL W P-5'-P-CCNC: 28 U/L (ref 0–45)
BILIRUB SERPL-MCNC: 0.3 MG/DL (ref 0.2–1.3)
BUN SERPL-MCNC: 24 MG/DL (ref 7–30)
CALCIUM SERPL-MCNC: 9.5 MG/DL (ref 8.5–10.1)
CHLORIDE SERPL-SCNC: 105 MMOL/L (ref 94–109)
CO2 SERPL-SCNC: 31 MMOL/L (ref 20–32)
CREAT SERPL-MCNC: 1.28 MG/DL (ref 0.52–1.04)
GFR SERPL CREATININE-BSD FRML MDRD: 35 ML/MIN/{1.73_M2}
GLUCOSE SERPL-MCNC: 293 MG/DL (ref 70–99)
POTASSIUM SERPL-SCNC: 3.8 MMOL/L (ref 3.4–5.3)
PROT SERPL-MCNC: 7.9 G/DL (ref 6.8–8.8)
SODIUM SERPL-SCNC: 143 MMOL/L (ref 133–144)

## 2019-07-16 DIAGNOSIS — I10 ESSENTIAL HYPERTENSION WITH GOAL BLOOD PRESSURE LESS THAN 130/80: Primary | ICD-10-CM

## 2019-07-16 DIAGNOSIS — I10 HYPERTENSION GOAL BP (BLOOD PRESSURE) < 140/90: Primary | ICD-10-CM

## 2019-07-16 NOTE — TELEPHONE ENCOUNTER
"Requested Prescriptions   Pending Prescriptions Disp Refills     losartan (COZAAR) 100 MG tablet [Pharmacy Med Name: LOSARTAN POTASSIUM 100 MG TAB]  Last Written Prescription Date:  na  Last Fill Quantity: na,  # refills: na   Last office visit: 7/9/2019 with prescribing provider:  Maia   Future Office Visit:     90 tablet 0     Sig: TAKE 1 TABLET BY MOUTH DAILY       Angiotensin-II Receptors Failed - 7/16/2019  9:34 AM        Failed - Blood pressure under 140/90 in past 12 months     BP Readings from Last 3 Encounters:   07/09/19 150/72   03/25/19 142/70   12/07/18 136/65                 Failed - Medication is active on med list        Failed - Normal serum creatinine on file in past 12 months     Recent Labs   Lab Test 07/09/19  1701   CR 1.28*             Passed - Recent (12 mo) or future (30 days) visit within the authorizing provider's specialty     Patient had office visit in the last 12 months or has a visit in the next 30 days with authorizing provider or within the authorizing provider's specialty.  See \"Patient Info\" tab in inbasket, or \"Choose Columns\" in Meds & Orders section of the refill encounter.              Passed - Patient is age 18 or older        Passed - No active pregnancy on record        Passed - Normal serum potassium on file in past 12 months     Recent Labs   Lab Test 07/09/19  1701   POTASSIUM 3.8                    Passed - No positive pregnancy test in past 12 months           "

## 2019-07-16 NOTE — TELEPHONE ENCOUNTER
"Requested Prescriptions   Pending Prescriptions Disp Refills     hydrochlorothiazide (HYDRODIURIL) 25 MG tablet [Pharmacy Med Name: HYDROCHLOROTHIAZIDE 25 MG TAB]  Last Written Prescription Date:  na  Last Fill Quantity: na,  # refills: na   Last office visit: 7/9/2019 with prescribing provider:  Maia   Future Office Visit:     90 tablet 0     Sig: TAKE 1 TABLET BY MOUTH DAILY       Diuretics (Including Combos) Protocol Failed - 7/16/2019  1:36 PM        Failed - Blood pressure under 140/90 in past 12 months     BP Readings from Last 3 Encounters:   07/09/19 150/72   03/25/19 142/70   12/07/18 136/65                 Failed - Medication is active on med list        Failed - Normal serum creatinine on file in past 12 months     Recent Labs   Lab Test 07/09/19  1701   CR 1.28*              Passed - Recent (12 mo) or future (30 days) visit within the authorizing provider's specialty     Patient had office visit in the last 12 months or has a visit in the next 30 days with authorizing provider or within the authorizing provider's specialty.  See \"Patient Info\" tab in inbasket, or \"Choose Columns\" in Meds & Orders section of the refill encounter.              Passed - Patient is age 18 or older        Passed - No active pregancy on record        Passed - Normal serum potassium on file in past 12 months     Recent Labs   Lab Test 07/09/19  1701   POTASSIUM 3.8                    Passed - Normal serum sodium on file in past 12 months     Recent Labs   Lab Test 07/09/19  1701                 Passed - No positive pregnancy test in past 12 months           "

## 2019-07-17 RX ORDER — HYDROCHLOROTHIAZIDE 25 MG/1
TABLET ORAL
Qty: 90 TABLET | Refills: 0 | Status: SHIPPED | OUTPATIENT
Start: 2019-07-17 | End: 2019-10-18

## 2019-07-17 NOTE — TELEPHONE ENCOUNTER
Routing refill request to provider for review/approval because:  Labs out of range:  CR  BP above range 140/90  Pt has hyzaar on medication list

## 2019-07-18 RX ORDER — LOSARTAN POTASSIUM 100 MG/1
TABLET ORAL
Qty: 90 TABLET | Refills: 0 | Status: SHIPPED | OUTPATIENT
Start: 2019-07-18 | End: 2019-10-18

## 2019-08-01 ENCOUNTER — PATIENT OUTREACH (OUTPATIENT)
Dept: CARE COORDINATION | Facility: CLINIC | Age: 84
End: 2019-08-01

## 2019-08-01 NOTE — PROGRESS NOTES
Clinic Care Coordination Contact  [facility]/Voicemail       Clinical Data: Care Coordinator Outreach following initial conversation re: resources and supports for pt's medication management and nutrition.   Outreach attempted x 1.  Left message on voicemail with call back information and requested return call.  Plan: Care Coordinator will try to reach patient again in 3-5 business days.  XAVIER Camarillo   Social Work Care Coordinator  [phone]

## 2019-08-09 NOTE — PROGRESS NOTES
Clinic Care Coordination Contact    Follow Up Progress Note      SW  outreach for follow up. Pt's daughter, Anabel, reports she was able to reach [place] community resources for additional information.     Pt continues to refuse to leave her home but was agreeable to having modifications made for increased safety. Family replaced stove/over and refrigerator. Family also added a garage opener.     Anabel reports her daughter, a timi in college, will be living with pt and provide informal support including laundry, chores, cooking and possibly transportation. Anabel feels this will also be a good support for pt's mental health, having someone to speak with each day.     Plan: Anabel denies additional questions or concerns at this time. She reports she has contact information for additional community resources and acknowledged she will call clinic with future needs. No further outreaches will be made at this time unless a new referral is made or a change in the pt's status occurs.     XAVIER Camarillo   Social Work Care Coordinator  [phone]

## 2019-09-16 ENCOUNTER — OFFICE VISIT (OUTPATIENT)
Dept: FAMILY MEDICINE | Facility: CLINIC | Age: 84
End: 2019-09-16
Payer: COMMERCIAL

## 2019-09-16 VITALS
OXYGEN SATURATION: 99 % | DIASTOLIC BLOOD PRESSURE: 60 MMHG | RESPIRATION RATE: 20 BRPM | TEMPERATURE: 97.8 F | SYSTOLIC BLOOD PRESSURE: 138 MMHG | WEIGHT: 116 LBS | HEIGHT: 63 IN | BODY MASS INDEX: 20.55 KG/M2 | HEART RATE: 77 BPM

## 2019-09-16 DIAGNOSIS — I25.10 CORONARY ARTERY DISEASE INVOLVING NATIVE CORONARY ARTERY OF NATIVE HEART WITHOUT ANGINA PECTORIS: ICD-10-CM

## 2019-09-16 DIAGNOSIS — Z79.4 TYPE 2 DIABETES MELLITUS WITHOUT COMPLICATION, WITH LONG-TERM CURRENT USE OF INSULIN (H): ICD-10-CM

## 2019-09-16 DIAGNOSIS — E78.5 HYPERLIPIDEMIA LDL GOAL <100: ICD-10-CM

## 2019-09-16 DIAGNOSIS — I10 ESSENTIAL HYPERTENSION WITH GOAL BLOOD PRESSURE LESS THAN 130/80: ICD-10-CM

## 2019-09-16 DIAGNOSIS — Z00.00 ROUTINE GENERAL MEDICAL EXAMINATION AT A HEALTH CARE FACILITY: Primary | ICD-10-CM

## 2019-09-16 DIAGNOSIS — E11.9 TYPE 2 DIABETES MELLITUS WITHOUT COMPLICATION, WITH LONG-TERM CURRENT USE OF INSULIN (H): ICD-10-CM

## 2019-09-16 DIAGNOSIS — Z23 NEED FOR PROPHYLACTIC VACCINATION AND INOCULATION AGAINST INFLUENZA: ICD-10-CM

## 2019-09-16 DIAGNOSIS — R41.3 MEMORY LOSS: ICD-10-CM

## 2019-09-16 PROCEDURE — 99397 PER PM REEVAL EST PAT 65+ YR: CPT | Mod: 25 | Performed by: INTERNAL MEDICINE

## 2019-09-16 PROCEDURE — 90662 IIV NO PRSV INCREASED AG IM: CPT | Performed by: INTERNAL MEDICINE

## 2019-09-16 PROCEDURE — 99207 ZZC FOOT EXAM  NO CHARGE: CPT | Mod: 25 | Performed by: INTERNAL MEDICINE

## 2019-09-16 PROCEDURE — G0008 ADMIN INFLUENZA VIRUS VAC: HCPCS | Performed by: INTERNAL MEDICINE

## 2019-09-16 ASSESSMENT — MIFFLIN-ST. JEOR: SCORE: 885.3

## 2019-09-16 ASSESSMENT — ACTIVITIES OF DAILY LIVING (ADL): CURRENT_FUNCTION: NO ASSISTANCE NEEDED

## 2019-09-16 NOTE — PATIENT INSTRUCTIONS
Keep taking the same medications.                  We will need to do some labs again next time.

## 2019-09-16 NOTE — PROGRESS NOTES
"SUBJECTIVE:   Chapis Riggs is a 89 year old female who presents for Preventive Visit.      Are you in the first 12 months of your Medicare coverage?  No    Healthy Habits:     In general, how would you rate your overall health?  Good    Frequency of exercise:  None    Do you usually eat at least 4 servings of fruit and vegetables a day, include whole grains    & fiber and avoid regularly eating high fat or \"junk\" foods?  Yes    Taking medications regularly:  Yes    Barriers to taking medications:  None    Medication side effects:  None    Ability to successfully perform activities of daily living:  No assistance needed    Home Safety:  No safety concerns identified    Hearing Impairment:  Difficulty following a conversation in a noisy restaurant or crowded room and difficulty following dialogue in the theater    In the past 6 months, have you been bothered by leaking of urine? Yes    In general, how would you rate your overall mental or emotional health?  Good      PHQ-2 Total Score: 0    Additional concerns today:  Yes    Do you feel safe in your environment? Yes    Do you have a Health Care Directive? Yes: Patient states has Advance Directive and will bring in a copy to clinic.      Fall risk     No falls within last year  Cognitive Screening   1) Repeat 3 items (Leader, Season, Table)    2) Clock draw: NORMAL  3) 3 item recall: Recalls 2 objects  Results: 2 items recalled: COGNITIVE IMPAIRMENT LESS LIKELY    Mini-CogTM Copyright S Valente. Licensed by the author for use in [facility]; reprinted with permission (stu@.[email or web address]). All rights reserved.      Do you have sleep apnea, excessive snoring or daytime drowsiness?: no    Reviewed and updated as needed this visit by clinical staff  Tobacco  Allergies  Meds  Med Hx  Surg Hx  Fam Hx  Soc Hx        Reviewed and updated as needed this visit by Provider        Social History     Tobacco Use     Smoking status: Never Smoker     Smokeless " tobacco: Never Used   Substance Use Topics     Alcohol use: No     If you drink alcohol do you typically have >3 drinks per day or >7 drinks per week? No    Alcohol Use 10/24/2016   Prescreen: >3 drinks/day or >7 drinks/week? The patient does not drink >3 drinks per day nor >7 drinks per week.             Nocturnal diaphoresis.        Has a snack at bedtime.               Checks her glucose BID ( 2 meals ) and prn if she feels whoozy.                  Glucose has been under 80 occasionally.      States that sweating is not a sx that she ever gets when her glucose is low.                     She checks her glucose before she goes to bed,and usually has a snack.                                                Here by herself today.          She still drives, and she has decided to stay in her own home.         She has a form from [employer] regarding driving privileges.                   Vision is good.        ROM of neck is good.                                            We reviewed her meds.                                         Pravastatin and calcium were stopped by her daughter.                            Current providers sharing in care for this patient include:   Patient Care Team:  Christiano Carvalho MD as PCP - General (Internal Medicine)  Christiano Carvalho MD as Assigned PCP    The following health maintenance items are reviewed in Epic and correct as of today:  Health Maintenance   Topic Date Due     EYE EXAM  03/02/1923     ZOSTER IMMUNIZATION (1 of 2) 03/02/1973     MICROALBUMIN  10/30/2014     DTAP/TDAP/TD IMMUNIZATION (2 - Td) 10/24/2017     LIPID  06/25/2019     MEDICARE ANNUAL WELLNESS VISIT  07/23/2019     DIABETIC FOOT EXAM  07/23/2019     INFLUENZA VACCINE (1) 09/01/2019     A1C  01/09/2020     BMP  07/09/2020     JOYCE ASSESSMENT  07/09/2020     PHQ-9  07/09/2020     FALL RISK ASSESSMENT  07/10/2020     ADVANCE CARE PLANNING  11/03/2020     TSH W/FREE T4 REFLEX  12/07/2020     PNEUMOCOCCAL IMMUNIZATION 65+  LOW/MEDIUM RISK  Completed     IPV IMMUNIZATION  Aged Out     MENINGITIS IMMUNIZATION  Aged Out     Patient Active Problem List    Diagnosis Date Noted     Coronary artery disease involving native heart with angina pectoris, unspecified vessel or lesion type (H) 03/25/2019     Priority: High     Pravastatin stopped by dtr in 7/19       Type 2 diabetes mellitus without complication, with long-term current use of insulin (H) 08/25/2018     Priority: High     CAD (coronary artery disease) 03/24/2014     Priority: High     Mild per 2009 evaluation                          Pravastatin stopped by dtr in 7/19       History of colonic polyps 01/30/2014     Priority: High     2004; a 2.5 cm polyp at 25 cm removed by Dr. Fabian; a full colonoscopy was done.                  In 7/09, he was not able to do a full colonoscopy as pt did not tolerate the exam.    Neg to mid-transverse colon.        In 9/17, flex sig neg to 45 cm           Type 2 diabetes mellitus without complication (H) 01/28/2014     Priority: High     Managed by ;        Dx approx 2003.   Rx insulin from the beginning.                               A1C 8.0 in 5/14; TSH  nml       Other and unspecified angina pectoris 10/26/2012     Priority: High     2009; nuc med adenosine stress suggests small area of anterior ischemia; medical Rx advised.     Stress echo 10/12 showed some LVH, nml EF, no wall motion abnormalities with exercise.                          Uses NTG rarely.          Normal ECG in 3/14       Essential hypertension with goal blood pressure less than 130/80 10/26/2012     Priority: High     Memory loss 10/26/2012     Priority: High      B12 and thyroid levels normal in 2014       Hyperlipidemia LDL goal <100 10/25/2012     Priority: High     Pravastatin stopped by dtr in 7/19       Pain of right lower leg 07/23/2018     Priority: Medium     Chronic midline low back pain without sciatica 08/28/2017     Priority: Medium     Epistaxis  11/28/2016     Priority: Medium     Cough 08/25/2014     Priority: Medium     CXR neg in 11/15 and 5/16       Ganglion cyst 03/24/2014     Priority: Medium     L hand and wrist               In 8/18, medial aspect R great toe       Arthritis 01/29/2014     Priority: Medium     Sx of; takes aleve rarely   ;       See imaging 2018; both hips, and lumbar spine       Diverticulitis of colon 10/26/2012     Priority: Medium     Problem list name updated by automated process. Provider to review       ACP (advance care planning) 11/03/2015     Priority: Low     Advance Care Planning 11/3/2015: ACP Review and Resources Provided:  Reviewed chart for advance care plan.  Chapis Riggs has no plan or code status on file. Discussed available resources and provided with information. Added by Su Francis             Preventive measure 01/28/2014     Priority: Low     Colonoscopy only to mid-transverse colon in 7/09  Mammogram 8/12, neg;breast exam 10/16;7/18                 Pap smear 1/03  [facility] 2005; results???                                     [facility]visit report from [facility] reviewed       Past Surgical History:   Procedure Laterality Date     ANAL PAPILLA      PROLAPSED     BREAST SURGERY      BIOPSY     GYN SURGERY      HYSTEROSCOPY/TUBAL     REPAIR PTOSIS BILATERAL  11/8/2011    Procedure:REPAIR PTOSIS BILATERAL; BILATERAL UPPER LID PTOSIS REPAIR; Surgeon:SALINAS DUNN; Location:[facility]     SIGMOIDOSCOPY FLEXIBLE N/A 9/6/2017    Procedure: SIGMOIDOSCOPY FLEXIBLE;  SIGMOIDOSCOPY FLEXIBLE;  Surgeon: Thang Begum MD;  Location:  GI     Social History     Socioeconomic History     Marital status:      Spouse name: Not on file     Number of children: 5     Years of education: Not on file     Highest education level: Not on file   Occupational History     Employer: RETIRED   Social Needs     Financial resource strain: Not on file     Food insecurity:     Worry: Not on file     Inability: Not on file      Transportation needs:     Medical: Not on file     Non-medical: Not on file   Tobacco Use     Smoking status: Never Smoker     Smokeless tobacco: Never Used   Substance and Sexual Activity     Alcohol use: No     Drug use: No     Sexual activity: Not Currently   Lifestyle     Physical activity:     Days per week: Not on file     Minutes per session: Not on file     Stress: Not on file   Relationships     Social connections:     Talks on phone: Not on file     Gets together: Not on file     Attends Jain service: Not on file     Active member of club or organization: Not on file     Attends meetings of clubs or organizations: Not on file     Relationship status: Not on file     Intimate partner violence:     Fear of current or ex partner: Not on file     Emotionally abused: Not on file     Physically abused: Not on file     Forced sexual activity: Not on file   Other Topics Concern     Parent/sibling w/ CABG, MI or angioplasty before 65F 55M? No   Social History Narrative    1 son is ; cancer;                    3 dtrs,1 living son     Immunization History   Administered Date(s) Administered     Influenza (High Dose) 3 valent vaccine 2012, 2013, 10/27/2014, 10/24/2016, 2017     Influenza (IIV3) PF 10/27/2008, 2010, 10/14/2010     Pneumo Conj 13-V (2010&after) 2015     Pneumococcal 23 valent 2008     TD (ADULT, 7+) 10/24/2007       BP Readings from Last 3 Encounters:   19 110/60   19 150/72   19 142/70    Wt Readings from Last 3 Encounters:   19 52.6 kg (116 lb)   19 53.1 kg (117 lb)   19 53.5 kg (118 lb)                  Current Outpatient Medications   Medication Sig Dispense Refill     amLODIPine (NORVASC) 10 MG tablet Take 1 tablet (10 mg) by mouth daily 90 tablet 3     aspirin 81 MG tablet Take 1 tablet by mouth daily.       bisoprolol (ZEBETA) 10 MG tablet TAKE TWO TABLETS BY MOUTH DAILY 180 tablet 2     blood glucose (ONE TOUCH  "ULTRA) test strip Pt tests 4 x / day 400 strip 1     hydrochlorothiazide (HYDRODIURIL) 25 MG tablet TAKE 1 TABLET BY MOUTH DAILY 90 tablet 0     insulin NPH-insulin regular (NOVOLIN 70/30) (70-30) 100 UNIT/ML injection Inject 8 Units Subcutaneous every morning Patient taking 8Units at 7:30 morning and 6Units at 5:30 evening       KLOR-CON 10 MEQ CR tablet TAKE 1 TABLET (10 MEQ) BY MOUTH EVERY OTHER DAY 45 tablet 4     losartan (COZAAR) 100 MG tablet TAKE 1 TABLET BY MOUTH DAILY 90 tablet 0     Multiple Vitamins-Minerals (CENTRUM SILVER PO) Take 1 tablet by mouth.       nitroGLYcerin (NITROSTAT) 0.4 MG sublingual tablet Place 1 tablet (0.4 mg) under the tongue every 5 minutes as needed for chest pain 25 tablet 3     Allergies   Allergen Reactions     Atorvastatin      Dizzy and weak     Lisinopril-Hctz Cough     Sulfa Drugs          Review of Systems  CONSTITUTIONAL: NEGATIVE for fever, chills, change in weight  INTEGUMENTARY/SKIN: NEGATIVE for worrisome rashes, moles or lesions  EYES: NEGATIVE for vision changes or irritation  ENT/MOUTH: NEGATIVE for ear, mouth and throat problems  RESP: NEGATIVE for significant cough or SOB  BREAST: NEGATIVE for masses, tenderness or discharge  CV: NEGATIVE for chest pain, palpitations or peripheral edema  GI: NEGATIVE for nausea, abdominal pain, heartburn, or change in bowel habits  : NEGATIVE for frequency, dysuria, or hematuria  MUSCULOSKELETAL:arthralgias   NEURO: NEGATIVE for weakness, dizziness or paresthesias  ENDOCRINE: NEGATIVE for temperature intolerance, skin/hair changes  HEME: NEGATIVE for bleeding problems  PSYCHIATRIC: NEGATIVE for changes in mood or affect    OBJECTIVE:   /60   Pulse 77   Temp 97.8  F (36.6  C)   Resp 20   Ht 1.6 m (5' 3\")   Wt 52.6 kg (116 lb)   SpO2 99%   Breastfeeding? No   BMI 20.55 kg/m   Estimated body mass index is 20.73 kg/m  as calculated from the following:    Height as of 7/9/19: 1.6 m (5' 3\").    Weight as of 7/9/19: 53.1 " kg (117 lb).  Physical Exam  GENERAL APPEARANCE: alert, no distress, elderly and frail  EYES: Eyes grossly normal to inspection  HENT: ear canals and TM's normal, nose and mouth without ulcers or lesions, oropharynx clear and oral mucous membranes moist  NECK: no adenopathy, no asymmetry, masses, or scars and thyroid normal to palpation  RESP: lungs clear to auscultation - no rales, rhonchi or wheezes  BREAST: normal without masses, tenderness or nipple discharge and no palpable axillary masses or adenopathy  CV: regular rate and rhythm, normal S1 S2, no S3 or S4, no murmur, click or rub, no peripheral edema   CV:   ABDOMEN: soft, nontender, no hepatosplenomegaly and no masses  MS: no musculoskeletal defects are noted and gait is age appropriate without ataxia  SKIN: no suspicious lesions or rashes  NEURO: Normal strength and tone and speech normal  DIABETIC FOOT EXAM: normal DP and PT pulses, no trophic changes or ulcerative lesions, DP reduced bilaterally and PT reduced bilaterally  PSYCH: tangential and impaired recall    Diagnostic Test Results:  none     ASSESSMENT / PLAN:   Chapis was seen today for physical.    Diagnoses and all orders for this visit:    Routine general medical examination at a health care facility    Type 2 diabetes mellitus without complication, with long-term current use of insulin (H)    Memory loss    Essential hypertension with goal blood pressure less than 130/80    Coronary artery disease involving native coronary artery of native heart without angina pectoris    Hyperlipidemia LDL goal <100         Summary and implications:  We reviewed multiple issues.           We reviewed all of the issues on the diagnoses list.                   She and her dtr have been working with the care coordinator. Notes reviewed.                                   Paper work completed.      Check labs again next time.         Ideally a family member would accompany her each visit.                Patient  "Instructions   Keep taking the same medications.                  We will need to do some labs again next time.             Return in about 3 months (around 12/16/2019) for medication review and refills, BP Recheck, diabetes follow up.      End of Life Planning:  Patient currently has an advanced directive: Yes.  Practitioner is supportive of decision.    COUNSELING:  Reviewed preventive health counseling, as reflected in patient instructions  Special attention given to:       Regular exercise       Healthy diet/nutrition    Estimated body mass index is 20.73 kg/m  as calculated from the following:    Height as of 7/9/19: 1.6 m (5' 3\").    Weight as of 7/9/19: 53.1 kg (117 lb).    Weight management plan noted, stable and monitoring     reports that she has never smoked. She has never used smokeless tobacco.      Appropriate preventive services were discussed with this patient, including applicable screening as appropriate for cardiovascular disease, diabetes, osteopenia/osteoporosis, and glaucoma.  As appropriate for age/gender, discussed screening for colorectal cancer, prostate cancer, breast cancer, and cervical cancer. Checklist reviewing preventive services available has been given to the patient.    Reviewed patients plan of care and provided an AVS. The Intermediate Care Plan ( asthma action plan, low back pain action plan, and migraine action plan) for Chapis meets the Care Plan requirement. This Care Plan has been established and reviewed with the Patient.    Counseling Resources:  ATP IV Guidelines  Pooled Cohorts Equation Calculator  Breast Cancer Risk Calculator  FRAX Risk Assessment  ICSI Preventive Guidelines  Dietary Guidelines for Americans, 2010  USDA's MyPlate  ASA Prophylaxis  Lung CA Screening    Christiano Carvalho MD  [facility]    Identified Health Risks:  "

## 2019-09-21 DIAGNOSIS — E78.5 HYPERLIPIDEMIA LDL GOAL <100: ICD-10-CM

## 2019-09-21 NOTE — TELEPHONE ENCOUNTER
"Requested Prescriptions   Pending Prescriptions Disp Refills     pravastatin (PRAVACHOL) 40 MG tablet [Pharmacy Med Name: PRAVASTATIN SODIUM 40 MG TAB]    Discontinued.    Last Written Prescription Date:  09/06/2018 END 07/09/2019  Last Fill Quantity: 90 tablet,  # refills: 3   Last office visit: 9/16/2019 with prescribing provider:  Maia   Future Office Visit:     90 tablet 3     Sig: TAKE 1 TABLET (40 MG) BY MOUTH DAILY       Statins Protocol Failed - 9/21/2019 12:37 AM        Failed - LDL on file in past 12 months     Recent Labs   Lab Test 06/25/18  1125   LDL 89             Failed - Medication is active on med list        Passed - No abnormal creatine kinase in past 12 months     No lab results found.             Passed - Recent (12 mo) or future (30 days) visit within the authorizing provider's specialty     Patient had office visit in the last 12 months or has a visit in the next 30 days with authorizing provider or within the authorizing provider's specialty.  See \"Patient Info\" tab in inbasket, or \"Choose Columns\" in Meds & Orders section of the refill encounter.              Passed - Patient is age 18 or older        Passed - No active pregnancy on record        Passed - No positive pregnancy test in past 12 months           "

## 2019-09-23 NOTE — TELEPHONE ENCOUNTER
Routing refill request to provider for review/approval because:  Drug not active on patient's medication list  Please verify with pt she is taking Rx.

## 2019-09-24 RX ORDER — PRAVASTATIN SODIUM 40 MG
TABLET ORAL
Qty: 90 TABLET | Refills: 3 | OUTPATIENT
Start: 2019-09-24

## 2019-10-03 DIAGNOSIS — E87.6 HYPOKALEMIA: ICD-10-CM

## 2019-10-03 NOTE — TELEPHONE ENCOUNTER
"Requested Prescriptions   Pending Prescriptions Disp Refills     KLOR-CON 10 MEQ CR tablet [Pharmacy Med Name: KLOR-CON M10 TABLET]    Last Written Prescription Date:  07/05/2018  Last Fill Quantity: 45 tablet,  # refills: 4   Last office visit: 9/16/2019 with prescribing provider:  Maia   Future Office Visit:     45 tablet 4     Sig: TAKE 1 TABLET (10 MEQ) BY MOUTH EVERY OTHER DAY       Potassium Supplements Protocol Passed - 10/3/2019  2:01 AM        Passed - Recent (12 mo) or future (30 days) visit within the authorizing provider's department     Patient has had an office visit with the authorizing provider or a provider within the authorizing providers department within the previous 12 mos or has a future within next 30 days. See \"Patient Info\" tab in inbasket, or \"Choose Columns\" in Meds & Orders section of the refill encounter.              Passed - Medication is active on med list        Passed - Patient is age 18 or older        Passed - Normal serum potassium in past 12 months     Recent Labs   Lab Test 07/09/19  1701   POTASSIUM 3.8                       "

## 2019-10-04 RX ORDER — POTASSIUM CHLORIDE 750 MG/1
TABLET, EXTENDED RELEASE ORAL
Qty: 45 TABLET | Refills: 2 | Status: SHIPPED | OUTPATIENT
Start: 2019-10-04 | End: 2020-09-14

## 2019-10-18 DIAGNOSIS — I10 HYPERTENSION GOAL BP (BLOOD PRESSURE) < 140/90: ICD-10-CM

## 2019-10-18 DIAGNOSIS — I10 ESSENTIAL HYPERTENSION WITH GOAL BLOOD PRESSURE LESS THAN 130/80: ICD-10-CM

## 2019-10-18 NOTE — TELEPHONE ENCOUNTER
"Requested Prescriptions   Pending Prescriptions Disp Refills     losartan (COZAAR) 100 MG tablet [Pharmacy Med Name: LOSARTAN POTASSIUM 100 MG TAB]  Last Written Prescription Date:  7/18/2019  Last Fill Quantity: 90 tablet,  # refills: 0   Last office visit: 9/16/2019 with prescribing provider:  Maia   Future Office Visit:     90 tablet 0     Sig: TAKE 1 TABLET BY MOUTH EVERY DAY       Angiotensin-II Receptors Failed - 10/18/2019  1:17 AM        Failed - Normal serum creatinine on file in past 12 months     Recent Labs   Lab Test 07/09/19  1701   CR 1.28*             Passed - Last blood pressure under 140/90 in past 12 months     BP Readings from Last 3 Encounters:   09/16/19 138/60   07/09/19 150/72   03/25/19 142/70                 Passed - Recent (12 mo) or future (30 days) visit within the authorizing provider's specialty     Patient has had an office visit with the authorizing provider or a provider within the authorizing providers department within the previous 12 mos or has a future within next 30 days. See \"Patient Info\" tab in inbasket, or \"Choose Columns\" in Meds & Orders section of the refill encounter.              Passed - Medication is active on med list        Passed - Patient is age 18 or older        Passed - No active pregnancy on record        Passed - Normal serum potassium on file in past 12 months     Recent Labs   Lab Test 07/09/19  1701   POTASSIUM 3.8                    Passed - No positive pregnancy test in past 12 months        hydrochlorothiazide (HYDRODIURIL) 25 MG tablet [Pharmacy Med Name: HYDROCHLOROTHIAZIDE 25 MG TAB]  Last Written Prescription Date:  7/17/2019  Last Fill Quantity: 90 tablet,  # refills: 0   Last office visit: 9/16/2019 with prescribing provider:  Maia   Future Office Visit:     90 tablet 0     Sig: TAKE 1 TABLET BY MOUTH EVERY DAY       Diuretics (Including Combos) Protocol Failed - 10/18/2019  1:17 AM        Failed - Normal serum creatinine on file in past 12 " "months     Recent Labs   Lab Test 07/09/19  1701   CR 1.28*              Passed - Blood pressure under 140/90 in past 12 months     BP Readings from Last 3 Encounters:   09/16/19 138/60   07/09/19 150/72   03/25/19 142/70                 Passed - Recent (12 mo) or future (30 days) visit within the authorizing provider's specialty     Patient has had an office visit with the authorizing provider or a provider within the authorizing providers department within the previous 12 mos or has a future within next 30 days. See \"Patient Info\" tab in inbasket, or \"Choose Columns\" in Meds & Orders section of the refill encounter.              Passed - Medication is active on med list        Passed - Patient is age 18 or older        Passed - No active pregancy on record        Passed - Normal serum potassium on file in past 12 months     Recent Labs   Lab Test 07/09/19  1701   POTASSIUM 3.8                    Passed - Normal serum sodium on file in past 12 months     Recent Labs   Lab Test 07/09/19  1701                 Passed - No positive pregnancy test in past 12 months           "

## 2019-10-21 RX ORDER — LOSARTAN POTASSIUM 100 MG/1
TABLET ORAL
Qty: 90 TABLET | Refills: 1 | Status: SHIPPED | OUTPATIENT
Start: 2019-10-21 | End: 2020-01-27

## 2019-10-21 RX ORDER — HYDROCHLOROTHIAZIDE 25 MG/1
TABLET ORAL
Qty: 90 TABLET | Refills: 1 | Status: SHIPPED | OUTPATIENT
Start: 2019-10-21 | End: 2020-01-27

## 2019-10-21 NOTE — TELEPHONE ENCOUNTER
Routing refill request to provider for review/approval because:  Cr elevated    Nicole Diaz RN- Triage [employer]

## 2020-01-13 ENCOUNTER — OFFICE VISIT (OUTPATIENT)
Dept: FAMILY MEDICINE | Facility: CLINIC | Age: 85
End: 2020-01-13
Payer: COMMERCIAL

## 2020-01-13 VITALS
SYSTOLIC BLOOD PRESSURE: 126 MMHG | TEMPERATURE: 97 F | HEIGHT: 63 IN | OXYGEN SATURATION: 99 % | HEART RATE: 69 BPM | DIASTOLIC BLOOD PRESSURE: 70 MMHG | WEIGHT: 113 LBS | RESPIRATION RATE: 20 BRPM | BODY MASS INDEX: 20.02 KG/M2

## 2020-01-13 DIAGNOSIS — R59.0 AXILLARY ADENOPATHY: ICD-10-CM

## 2020-01-13 DIAGNOSIS — Z29.9 PREVENTIVE MEASURE: ICD-10-CM

## 2020-01-13 DIAGNOSIS — B02.9 HERPES ZOSTER WITHOUT COMPLICATION: Primary | ICD-10-CM

## 2020-01-13 PROCEDURE — 99214 OFFICE O/P EST MOD 30 MIN: CPT | Performed by: INTERNAL MEDICINE

## 2020-01-13 RX ORDER — VALACYCLOVIR HYDROCHLORIDE 1 G/1
1000 TABLET, FILM COATED ORAL 2 TIMES DAILY
Qty: 14 TABLET | Refills: 0 | Status: SHIPPED | OUTPATIENT
Start: 2020-01-13 | End: 2020-01-27

## 2020-01-13 ASSESSMENT — MIFFLIN-ST. JEOR: SCORE: 871.69

## 2020-01-13 NOTE — PATIENT INSTRUCTIONS
Go ahead and take the Valtrex twice per day for 7 days.              Take some Tylenol ; 650 mg three times per day for pain relief.                            Please follow up in 2-3 weeks.           Please bring in all of your pill bottles next time.

## 2020-01-13 NOTE — PROGRESS NOTES
Subjective     Chapis Riggs is a 89 year old female who presents to clinic today for the following health issues:    HPI   Poss Shingles? in right waist and right lower back- started several days ago.               Here with her son Brendan from [facility].                                She reports pain in the right groin area, radiating down the right leg, for limited number of days.  She is not exactly sure how long this has been going on.  She also has noted a rash in the right lower quadrant for the past 2 or 3 days.               She gets some relief using Tylenol.                                                  She has also noted some axillary adenopathy on the right.  This is tender to palpation.                She still lives alone.   Neighbors help with snow shoveling.  Her son will be taking her shopping today.        Current Outpatient Medications   Medication Sig Dispense Refill     amLODIPine (NORVASC) 10 MG tablet Take 1 tablet (10 mg) by mouth daily 90 tablet 3     aspirin 81 MG tablet Take 1 tablet by mouth daily.       bisoprolol (ZEBETA) 10 MG tablet TAKE TWO TABLETS BY MOUTH DAILY 180 tablet 2     blood glucose (ONE TOUCH ULTRA) test strip Pt tests 4 x / day 400 strip 1     hydrochlorothiazide (HYDRODIURIL) 25 MG tablet TAKE 1 TABLET BY MOUTH EVERY DAY 90 tablet 1             KLOR-CON 10 MEQ CR tablet TAKE 1 TABLET (10 MEQ) BY MOUTH EVERY OTHER DAY 45 tablet 2     losartan (COZAAR) 100 MG tablet TAKE 1 TABLET BY MOUTH EVERY DAY 90 tablet 1     Multiple Vitamins-Minerals (CENTRUM SILVER PO) Take 1 tablet by mouth.       nitroGLYcerin (NITROSTAT) 0.4 MG sublingual tablet Place 1 tablet (0.4 mg) under the tongue every 5 minutes as needed for chest pain 25 tablet 3     NOVOLOG MIX 70/30 FLEXPEN (70-30) 100 UNIT/ML susp INJECT 8 UNITS SUBCUTANEOUSLY EVERY MORNING, INJECT 5 UNITS EVERY EVENING, 15-30 MIN BEFORE MEAL 15 mL 3     Allergies   Allergen Reactions     Atorvastatin      Dizzy and weak      "Lisinopril-Hctz Cough     Sulfa Drugs      Recent Labs   Lab Test 07/09/19  1701 12/07/18  1259 06/25/18  1125  03/29/17 10/24/16  1137  09/14/15  1102  08/22/13  0813   A1C 7.6* 7.4* 7.1*  --  7.3*  --   --   --    < > 7.1*   LDL  --   --  89  --   --  91  --  102   < > 103   HDL  --   --  71  --   --   --   --  67  --  59   TRIG  --   --  74  --   --   --   --  65  --  78   ALT 28 23 26  --   --  27   < > 30   < > 24   CR 1.28* 1.20* 1.13*   < > 1.36* 1.36*   < > 1.30*   < > 1.50*   GFRESTIMATED 35* 42* 45*   < > 33* 36*   < > 38*   < > 33*   GFRESTBLACK 41* 50* 54*   < > 39* 44*   < > 46*   < > 39*   POTASSIUM 3.8 4.1 3.9   < > 4.1 3.5   < > 4.6   < > 4.6   TSH  --  2.95  --   --  2.78  --   --   --    < >  --     < > = values in this interval not displayed.      BP Readings from Last 3 Encounters:   01/13/20 126/70   09/16/19 138/60   07/09/19 150/72    Wt Readings from Last 3 Encounters:   01/13/20 51.3 kg (113 lb)   09/16/19 52.6 kg (116 lb)   07/09/19 53.1 kg (117 lb)                      Reviewed and updated as needed this visit by Provider         Review of Systems   ROS COMP: CONSTITUTIONAL:NEGATIVE  for chills and fever   GI: NEGATIVE for hematochezia and melena  : negative for dysuria and hematuria      Objective    /70   Pulse 69   Temp 97  F (36.1  C)   Resp 20   Ht 1.6 m (5' 3\")   Wt 51.3 kg (113 lb)   SpO2 99%   BMI 20.02 kg/m    Body mass index is 20.02 kg/m .  Physical Exam   GENERAL APPEARANCE: alert, no distress and Nourishment underweight   BREAST: normal without masses, tenderness or nipple discharge  LYMPHATICS: axillary: Several tiny lymph nodes in both axillae; slightly tender  SKIN: Herpes zoster rash right L1 dermatome; some of the lesions are showing eschar formation already    Diagnostic Test Results:  none         Assessment & Plan     Chapis was seen today for pain.    Diagnoses and all orders for this visit:    Herpes zoster without complication  Comments:  R L1 " dermatome  Orders:  -     valACYclovir (VALTREX) 1000 mg tablet; Take 1 tablet (1,000 mg) by mouth 2 times daily for 7 days    Axillary adenopathy    Preventive measure           Summary and implications:  We reviewed multiple issues.           We reviewed all of the issues on the diagnoses list.                 The time course is a bit uncertain.               We will go ahead and order Valtrex.           She seems to be getting some pain relief already with.  Additional analgesics will not be ordered today.                        When I see her in 2 weeks we will recheck her axillary adenopathy also.  Patient Instructions   Go ahead and take the Valtrex twice per day for 7 days.              Take some Tylenol ; 650 mg three times per day for pain relief.                            Please follow up in 2-3 weeks.           Please bring in all of your pill bottles next time.         Return in about 2 weeks (around 1/27/2020) for follow up of several issues.    Christiano Carvalho MD  [facility]

## 2020-01-20 ENCOUNTER — TELEPHONE (OUTPATIENT)
Dept: FAMILY MEDICINE | Facility: CLINIC | Age: 85
End: 2020-01-20

## 2020-01-20 NOTE — TELEPHONE ENCOUNTER
Danielle sent a request for the patient's most recent lab results for     Lipid Panel  BP  Ht/Wt  HBA1c  Serum Creatinine   Urine for Microalbumin    Request sent to [facility]

## 2020-01-27 ENCOUNTER — OFFICE VISIT (OUTPATIENT)
Dept: FAMILY MEDICINE | Facility: CLINIC | Age: 85
End: 2020-01-27
Payer: COMMERCIAL

## 2020-01-27 VITALS
OXYGEN SATURATION: 98 % | RESPIRATION RATE: 20 BRPM | SYSTOLIC BLOOD PRESSURE: 136 MMHG | DIASTOLIC BLOOD PRESSURE: 68 MMHG | HEART RATE: 74 BPM | TEMPERATURE: 98.3 F | WEIGHT: 113 LBS | BODY MASS INDEX: 20.02 KG/M2

## 2020-01-27 DIAGNOSIS — I10 ESSENTIAL HYPERTENSION WITH GOAL BLOOD PRESSURE LESS THAN 130/80: ICD-10-CM

## 2020-01-27 DIAGNOSIS — B02.9 HERPES ZOSTER WITHOUT COMPLICATION: Primary | ICD-10-CM

## 2020-01-27 DIAGNOSIS — R41.89 COGNITIVE IMPAIRMENT: ICD-10-CM

## 2020-01-27 PROCEDURE — 99213 OFFICE O/P EST LOW 20 MIN: CPT | Performed by: INTERNAL MEDICINE

## 2020-01-27 RX ORDER — HYDROCHLOROTHIAZIDE 25 MG/1
25 TABLET ORAL DAILY
Qty: 90 TABLET | Refills: 3 | Status: SHIPPED | OUTPATIENT
Start: 2020-01-27 | End: 2020-04-23

## 2020-01-27 RX ORDER — AMLODIPINE BESYLATE 10 MG/1
10 TABLET ORAL DAILY
Qty: 90 TABLET | Refills: 3 | Status: SHIPPED | OUTPATIENT
Start: 2020-01-27 | End: 2021-01-13

## 2020-01-27 RX ORDER — LOSARTAN POTASSIUM 100 MG/1
100 TABLET ORAL DAILY
Qty: 90 TABLET | Refills: 3 | Status: SHIPPED | OUTPATIENT
Start: 2020-01-27 | End: 2020-04-17

## 2020-01-27 NOTE — PATIENT INSTRUCTIONS
Go ahead and keep taking some Tylenol; 500 mg every 4 hours as needed to relieve pain due to shingles.                                        Keep taking the same medications otherwise.

## 2020-01-27 NOTE — PROGRESS NOTES
Subjective     Chapis Riggs is a 89 year old female who presents to clinic today for the following health issues:    HPI   Follow up on Shingles. Improved    Here again with her son (from [facility]).                           Overall, her pain seems to be improving.                    She was taking some APAP.             She has her pill bottles with her.            Multiple circuitous conversations regarding her meds; the main confusion is that she now has separate losartan and hydrochlorothiazide,rather than the combination product.  She put them both into one bottle.                     She has her other meds, except does not have KCL.                         Current Outpatient Medications   Medication Sig Dispense Refill     amLODIPine (NORVASC) 10 MG tablet Take 1 tablet (10 mg) by mouth daily 90 tablet 3     aspirin 81 MG tablet Take 1 tablet by mouth daily.       bisoprolol (ZEBETA) 10 MG tablet TAKE TWO TABLETS BY MOUTH DAILY 180 tablet 2     blood glucose (ONE TOUCH ULTRA) test strip Pt tests 4 x / day 400 strip 1     hydrochlorothiazide (HYDRODIURIL) 25 MG tablet TAKE 1 TABLET BY MOUTH EVERY DAY 90 tablet 1     KLOR-CON 10 MEQ CR tablet TAKE 1 TABLET (10 MEQ) BY MOUTH EVERY OTHER DAY 45 tablet 2     losartan (COZAAR) 100 MG tablet TAKE 1 TABLET BY MOUTH EVERY DAY 90 tablet 1     Multiple Vitamins-Minerals (CENTRUM SILVER PO) Take 1 tablet by mouth.       nitroGLYcerin (NITROSTAT) 0.4 MG sublingual tablet Place 1 tablet (0.4 mg) under the tongue every 5 minutes as needed for chest pain 25 tablet 3     NOVOLOG MIX 70/30 FLEXPEN (70-30) 100 UNIT/ML susp INJECT 8 UNITS SUBCUTANEOUSLY EVERY MORNING, INJECT 5 UNITS EVERY EVENING, 15-30 MIN BEFORE MEAL 15 mL 3     valACYclovir (VALTREX) 1000 mg tablet Take 1 tablet (1,000 mg) by mouth 2 times daily for 7 days 14 tablet 0     Allergies   Allergen Reactions     Atorvastatin      Dizzy and weak     Lisinopril-Hctz Cough     Sulfa Drugs      BP Readings from  Last 3 Encounters:   01/27/20 136/68   01/13/20 126/70   09/16/19 138/60    Wt Readings from Last 3 Encounters:   01/27/20 51.3 kg (113 lb)   01/13/20 51.3 kg (113 lb)   09/16/19 52.6 kg (116 lb)                      Reviewed and updated as needed this visit by Provider         Review of Systems   ROS COMP: CONSTITUTIONAL:NEGATIVE for fever, chills, change in weight  HEME/ALLERGY/IMMUNE: NEGATIVE for painful axillary nodes      Objective    /68   Pulse 74   Temp 98.3  F (36.8  C)   Resp 20   Wt 51.3 kg (113 lb)   SpO2 98%   BMI 20.02 kg/m    Body mass index is 20.02 kg/m .  Physical Exam   LYMPHATICS: axillary: tiny,non tender nodes  SKIN: resolving H zoster rash  PSYCH: tangential and perseverates    Diagnostic Test Results:  none         Assessment & Plan     Chapis was seen today for recheck.    Diagnoses and all orders for this visit:    Herpes zoster without complication    Essential hypertension with goal blood pressure less than 130/80  -     amLODIPine (NORVASC) 10 MG tablet; Take 1 tablet (10 mg) by mouth daily  -     hydrochlorothiazide (HYDRODIURIL) 25 MG tablet; Take 1 tablet (25 mg) by mouth daily  -     losartan (COZAAR) 100 MG tablet; Take 1 tablet (100 mg) by mouth daily    Cognitive impairment           Medications reviewed with patient and her son several times.       He will help her to avoid mixing the losartan and the hydrochlorothiazide in the same bottle.   Patient Instructions   Go ahead and keep taking some Tylenol; 500 mg every 4 hours as needed to relieve pain due to shingles.                                        Keep taking the same medications otherwise.                                        Return in about 3 months (around 4/27/2020) for BP Recheck and other issues.         .    Christiano Carvalho MD  [facility]

## 2020-03-09 DIAGNOSIS — I10 HYPERTENSION GOAL BP (BLOOD PRESSURE) < 140/90: ICD-10-CM

## 2020-03-09 RX ORDER — BISOPROLOL FUMARATE 10 MG/1
TABLET, FILM COATED ORAL
Qty: 180 TABLET | Refills: 2 | Status: SHIPPED | OUTPATIENT
Start: 2020-03-09

## 2020-03-09 NOTE — TELEPHONE ENCOUNTER
"Requested Prescriptions   Pending Prescriptions Disp Refills     bisoprolol (ZEBETA) 10 MG tablet [Pharmacy Med Name: BISOPROLOL FUMARATE 10 MG TAB] 180 tablet 2     Sig: TAKE TWO TABLETS BY MOUTH DAILY     Last Written Prescription Date:  6/24/2019  Last Fill Quantity: 180,  # refills: 2   Last office visit: 1/27/2020 with prescribing provider:  Maia   Future Office Visit:            Beta-Blockers Protocol Passed - 3/9/2020  2:11 AM        Passed - Blood pressure under 140/90 in past 12 months     BP Readings from Last 3 Encounters:   01/27/20 136/68   01/13/20 126/70   09/16/19 138/60                 Passed - Patient is age 6 or older        Passed - Recent (12 mo) or future (30 days) visit within the authorizing provider's specialty     Patient has had an office visit with the authorizing provider or a provider within the authorizing providers department within the previous 12 mos or has a future within next 30 days. See \"Patient Info\" tab in inbasket, or \"Choose Columns\" in Meds & Orders section of the refill encounter.              Passed - Medication is active on med list             "

## 2020-03-09 NOTE — TELEPHONE ENCOUNTER
Prescription approved per [facility] Refill Protocol for 12 months of refills since last appointment, which was 01/27/20

## 2020-03-10 ENCOUNTER — TELEPHONE (OUTPATIENT)
Dept: FAMILY MEDICINE | Facility: CLINIC | Age: 85
End: 2020-03-10

## 2020-03-10 NOTE — TELEPHONE ENCOUNTER
Patient Contact (Daughter: [phone])    Attempt # 1    Was call answered?  No.  Left message on voicemail with information to call triage back.    On callback: really provider message.

## 2020-03-10 NOTE — TELEPHONE ENCOUNTER
Reason for Call: Request for an order or referral:    Order or referral being requested: Eye Exam Referral     Date needed: as soon as possible    Has the patient been seen by the PCP for this problem? YES    Additional comments: The patient's daughter in law called and stated that the patient has broken her glasses and they need to take her to get her eyes checked and to get them fixed.  She was wondering if Dr. Carvalho would recommend or refer the patient to an Eye clinic in the [facility] area so she can get that exam and to get them fixed?      Phone number Patient can be reached at:  Other phone number:  [phone]    Best Time:  Any    Can we leave a detailed message on this number?  YES    Call taken on 3/10/2020 at 11:13 AM by Michell Barry

## 2020-03-10 NOTE — TELEPHONE ENCOUNTER
Do you have any eye clinics which you would want pt to be seen at considering she has DM 2?  Or just any that work with their insurance?

## 2020-03-11 NOTE — TELEPHONE ENCOUNTER
Sonal calling back.  RN advised on provider message.  Daughter states understanding.    No further action needed at this time.

## 2020-04-01 DIAGNOSIS — E11.9 TYPE 2 DIABETES MELLITUS WITHOUT COMPLICATION, WITH LONG-TERM CURRENT USE OF INSULIN (H): ICD-10-CM

## 2020-04-01 DIAGNOSIS — Z79.4 TYPE 2 DIABETES MELLITUS WITHOUT COMPLICATION, WITH LONG-TERM CURRENT USE OF INSULIN (H): ICD-10-CM

## 2020-04-01 RX ORDER — INSULIN ASPART 100 [IU]/ML
INJECTION, SUSPENSION SUBCUTANEOUS
Qty: 15 ML | Refills: 11 | Status: SHIPPED | OUTPATIENT
Start: 2020-04-01

## 2020-04-01 NOTE — TELEPHONE ENCOUNTER
"Requested Prescriptions   Pending Prescriptions Disp Refills     NOVOLOG MIX 70/30 FLEXPEN (70-30) 100 UNIT/ML susp [Pharmacy Med Name: NOVOLOG MIX 70-30 FLEXPEN]  Last Written Prescription Date:  11/22/2019   Last Fill Quantity: 15 mL,  # refills: 3   Last office visit: 1/27/2020 with prescribing provider:  aMia   Future Office Visit:   Next 5 appointments (look out 90 days)    Apr 14, 2020 10:30 AM CDT  Office Visit with Christiano Carvalho MD  [facility] [address] [place] [id number]  [phone]           1     Sig: INJECT 8 UNITS SUBCUTANEOUSLY EVERY MORNING, INJECT 5 UNITS EVERY EVENING, 15-30 MIN BEFORE MEAL       Insulin Mixes Protocol Failed - 4/1/2020  1:20 AM        Failed - HgbA1C in past 3 or 6 months     If HgbA1C is 8 or greater, it needs to be on file within the past 3 months.  If less than 8, must be on file within the past 6 months.     Recent Labs   Lab Test 07/09/19  1701   A1C 7.6*             Passed - Serum creatinine on file in past 12 months     Recent Labs   Lab Test 07/09/19  1701   CR 1.28*       Ok to refill medication if creatinine is low          Passed - Medication is active on mded list        Passed - Patient is age 18 or older        Passed - Recent (6 mo) or future (30 days) visit within the authorizing provider's specialty     Patient had office visit in the last 6 months or has a visit in the next 30 days with authorizing provider or within the authorizing provider's specialty.  See \"Patient Info\" tab in inbasket, or \"Choose Columns\" in Meds & Orders section of the refill encounter.                  "

## 2020-04-14 ENCOUNTER — VIRTUAL VISIT (OUTPATIENT)
Dept: FAMILY MEDICINE | Facility: CLINIC | Age: 85
End: 2020-04-14
Payer: COMMERCIAL

## 2020-04-14 DIAGNOSIS — I10 ESSENTIAL HYPERTENSION WITH GOAL BLOOD PRESSURE LESS THAN 130/80: ICD-10-CM

## 2020-04-14 DIAGNOSIS — R41.89 COGNITIVE IMPAIRMENT: ICD-10-CM

## 2020-04-14 DIAGNOSIS — E11.9 TYPE 2 DIABETES MELLITUS WITHOUT COMPLICATION, WITH LONG-TERM CURRENT USE OF INSULIN (H): Primary | ICD-10-CM

## 2020-04-14 DIAGNOSIS — Z79.4 TYPE 2 DIABETES MELLITUS WITHOUT COMPLICATION, WITH LONG-TERM CURRENT USE OF INSULIN (H): Primary | ICD-10-CM

## 2020-04-14 PROCEDURE — 99214 OFFICE O/P EST MOD 30 MIN: CPT | Mod: 95 | Performed by: INTERNAL MEDICINE

## 2020-04-14 NOTE — PROGRESS NOTES
"Chapis Riggs is a 89 year old female who is being evaluated via a billable telephone visit.      The patient has been notified of following:     \"This telephone visit will be conducted via a call between you and your physician/provider. We have found that certain health care needs can be provided without the need for a physical exam.  This service lets us provide the care you need with a short phone conversation.  If a prescription is necessary we can send it directly to your pharmacy.  If lab work is needed we can place an order for that and you can then stop by our lab to have the test done at a later time.    Telephone visits are billed at different rates depending on your insurance coverage. During this emergency period, for some insurers they may be billed the same as an in-person visit.  Please reach out to your insurance provider with any questions.    If during the course of the call the physician/provider feels a telephone visit is not appropriate, you will not be charged for this service.\"    Patient has given verbal consent for Telephone visit?  Yes    How would you like to obtain your AVS? Mail a copy    Subjective     Chapis Riggs is a 89 year old female who presents to clinic today for the following health issues:    Diabetes Follow-up      How often are you checking your blood sugar? Maybe 3-4 times per day    What concerns do you have today about your diabetes? None     Do you have any of these symptoms? (Select all that apply)  No numbness or tingling in feet.  No redness, sores or blisters on feet.  No complaints of excessive thirst.  No reports of blurry vision.  No significant changes to weight.    Have you had a diabetic eye exam in the last 12 months? No        BP Readings from Last 2 Encounters:   01/27/20 136/68   01/13/20 126/70     Hemoglobin A1C (%)   Date Value   07/09/2019 7.6 (H)   12/07/2018 7.4 (H)     LDL Cholesterol Calculated (mg/dL)   Date Value   06/25/2018 89 "   09/14/2015 102     LDL Cholesterol Direct (mg/dL)   Date Value   10/24/2016 91               Hypertension Follow-up      Do you check your blood pressure regularly outside of the clinic? No     Are you following a low salt diet? Yes    Are your blood pressures ever more than 140 on the top number (systolic) OR more   than 90 on the bottom number (diastolic), for example 140/90? unsure      How many servings of fruits and vegetables do you eat daily?  2-3    On average, how many sweetened beverages do you drink each day (Examples: soda, juice, sweet tea, etc.  Do NOT count diet or artificially sweetened beverages)?   0    How many days per week do you exercise enough to make your heart beat faster? 3 or less    How many minutes a day do you exercise enough to make your heart beat faster? 20 - 29    How many days per week do you miss taking your medication? 0      -follow up on shingles, not having any pain                    Her son and her dtr were also on the phone call.                   They live in [place].     The patient still lives in her home in [place].               They have been driving down once per week to get this patient's groceries, prescription refills, etc.                         They comment on this patient's memory issues.      However, they believe this patient is checking her glucose, taking her insulin, and taking her other medications properly.                                   This patient checks her glucose frequently.  She tries to keep it in the range of .         She has not had any significant hypoglycemia or any known hypoglycemia.                     I told her that her lower range is too low.  She does not need tight control at her age.                 She is in good spirits on the phone, makes frequent jocular comments.           When they take her shopping etc. this patient stays in the vehicle.            She is well aware of the coronavirus stay at home advice.              She plans to stay in her own for at least 2 more years, when her reverse mortgage expires.                            Current Outpatient Medications   Medication Sig Dispense Refill     amLODIPine (NORVASC) 10 MG tablet Take 1 tablet (10 mg) by mouth daily 90 tablet 3     aspirin 81 MG tablet Take 1 tablet by mouth daily.       bisoprolol (ZEBETA) 10 MG tablet TAKE TWO TABLETS BY MOUTH DAILY 180 tablet 2     blood glucose (ONE TOUCH ULTRA) test strip Pt tests 4 x / day 400 strip 1     hydrochlorothiazide (HYDRODIURIL) 25 MG tablet Take 1 tablet (25 mg) by mouth daily 90 tablet 3     KLOR-CON 10 MEQ CR tablet TAKE 1 TABLET (10 MEQ) BY MOUTH EVERY OTHER DAY 45 tablet 2     losartan (COZAAR) 100 MG tablet Take 1 tablet (100 mg) by mouth daily 90 tablet 3     Multiple Vitamins-Minerals (CENTRUM SILVER PO) Take 1 tablet by mouth.       nitroGLYcerin (NITROSTAT) 0.4 MG sublingual tablet Place 1 tablet (0.4 mg) under the tongue every 5 minutes as needed for chest pain 25 tablet 3     NOVOLOG MIX 70/30 FLEXPEN (70-30) 100 UNIT/ML susp INJECT 8 UNITS SUBCUTANEOUSLY EVERY MORNING, INJECT 5 UNITS EVERY EVENING, 15-30 MIN BEFORE MEAL 15 mL 11     Allergies   Allergen Reactions     Atorvastatin      Dizzy and weak     Lisinopril-Hctz Cough     Sulfa Drugs      Recent Labs   Lab Test 07/09/19  1701 12/07/18  1259 06/25/18  1125  03/29/17 10/24/16  1137  09/14/15  1102  08/22/13  0813   A1C 7.6* 7.4* 7.1*  --  7.3*  --   --   --    < > 7.1*   LDL  --   --  89  --   --  91  --  102   < > 103   HDL  --   --  71  --   --   --   --  67  --  59   TRIG  --   --  74  --   --   --   --  65  --  78   ALT 28 23 26  --   --  27   < > 30   < > 24   CR 1.28* 1.20* 1.13*   < > 1.36* 1.36*   < > 1.30*   < > 1.50*   GFRESTIMATED 35* 42* 45*   < > 33* 36*   < > 38*   < > 33*   GFRESTBLACK 41* 50* 54*   < > 39* 44*   < > 46*   < > 39*   POTASSIUM 3.8 4.1 3.9   < > 4.1 3.5   < > 4.6   < > 4.6   TSH  --  2.95  --   --  2.78  --   --   --     < >  --     < > = values in this interval not displayed.      BP Readings from Last 3 Encounters:   01/27/20 136/68   01/13/20 126/70   09/16/19 138/60    Wt Readings from Last 3 Encounters:   01/27/20 51.3 kg (113 lb)   01/13/20 51.3 kg (113 lb)   09/16/19 52.6 kg (116 lb)                    Reviewed and updated as needed this visit by Provider         Review of Systems   ROS COMP: CONSTITUTIONAL:NEGATIVE for fever, chills, change in weight  RESP:NEGATIVE for significant cough or SOB       Objective   Reported vitals:  There were no vitals taken for this visit.     PSYCH: Alert.      ; coherent speech, normal   rate and volume.     Her affect is normal and pleasant  RESP: No cough, no audible wheezing, able to talk in full sentences  Remainder of exam unable to be completed due to telephone visits    Diagnostic Test Results:  none         Assessment/Plan:  1. Type 2 diabetes mellitus without complication, with long-term current use of insulin (H)  Stable by report.  Tight control is not needed.    2. Cognitive impairment  Stable but present.  She still lives alone with the help of her family.  She seems to be taking her medications properly.    3. Essential hypertension with goal blood pressure less than 130/80  Continue current medications.      Return in about 6 months (around 10/14/2020) for yearly wellness visit, labs will be needed.      Phone call duration:  15 minutes    Christiano Carvalho MD

## 2020-04-15 DIAGNOSIS — E11.9 TYPE 2 DIABETES, HBA1C GOAL < 8% (H): ICD-10-CM

## 2020-04-15 NOTE — TELEPHONE ENCOUNTER
"Requested Prescriptions   Pending Prescriptions Disp Refills     blood glucose (ONETOUCH ULTRA) test strip 400 strip 1     Sig: Pt tests 4 x / day       Diabetic Supplies Protocol Passed - 4/15/2020 11:32 AM        Passed - Medication is active on med list        Passed - Patient is 18 years of age or older        Passed - Recent (6 mo) or future (30 days) visit within the authorizing provider's specialty     Patient had office visit in the last 6 months or has a visit in the next 30 days with authorizing provider.  See \"Patient Info\" tab in inbasket, or \"Choose Columns\" in Meds & Orders section of the refill encounter.                 "

## 2020-04-23 DIAGNOSIS — I10 ESSENTIAL HYPERTENSION WITH GOAL BLOOD PRESSURE LESS THAN 130/80: ICD-10-CM

## 2020-04-23 RX ORDER — HYDROCHLOROTHIAZIDE 25 MG/1
TABLET ORAL
Qty: 90 TABLET | Refills: 1 | Status: SHIPPED | OUTPATIENT
Start: 2020-04-23 | End: 2020-09-14

## 2020-05-21 ENCOUNTER — TRANSFERRED RECORDS (OUTPATIENT)
Dept: HEALTH INFORMATION MANAGEMENT | Facility: CLINIC | Age: 85
End: 2020-05-21

## 2020-05-21 LAB — RETINOPATHY: NEGATIVE

## 2020-07-14 ENCOUNTER — APPOINTMENT (OUTPATIENT)
Dept: CT IMAGING | Facility: CLINIC | Age: 85
End: 2020-07-14
Attending: EMERGENCY MEDICINE
Payer: COMMERCIAL

## 2020-07-14 ENCOUNTER — NURSE TRIAGE (OUTPATIENT)
Dept: NURSING | Facility: CLINIC | Age: 85
End: 2020-07-14

## 2020-07-14 ENCOUNTER — HOSPITAL ENCOUNTER (EMERGENCY)
Facility: CLINIC | Age: 85
Discharge: HOME | End: 2020-07-15
Attending: EMERGENCY MEDICINE | Admitting: EMERGENCY MEDICINE
Payer: COMMERCIAL

## 2020-07-14 DIAGNOSIS — R42 DIZZINESS: ICD-10-CM

## 2020-07-14 LAB
ALBUMIN SERPL-MCNC: 3.8 G/DL (ref 3.4–5)
ALBUMIN UR-MCNC: 10 MG/DL
ALP SERPL-CCNC: 81 U/L (ref 40–150)
ALT SERPL W P-5'-P-CCNC: 21 U/L (ref 0–50)
ANION GAP SERPL CALCULATED.3IONS-SCNC: 7 MMOL/L (ref 3–14)
APPEARANCE UR: CLEAR
AST SERPL W P-5'-P-CCNC: 20 U/L (ref 0–45)
BASOPHILS # BLD AUTO: 0 10E9/L (ref 0–0.2)
BASOPHILS NFR BLD AUTO: 0.4 %
BILIRUB SERPL-MCNC: 0.4 MG/DL (ref 0.2–1.3)
BILIRUB UR QL STRIP: NEGATIVE
BUN SERPL-MCNC: 23 MG/DL (ref 7–30)
CALCIUM SERPL-MCNC: 9.5 MG/DL (ref 8.5–10.1)
CHLORIDE SERPL-SCNC: 104 MMOL/L (ref 94–109)
CO2 SERPL-SCNC: 30 MMOL/L (ref 20–32)
COLOR UR AUTO: YELLOW
CREAT SERPL-MCNC: 1.13 MG/DL (ref 0.52–1.04)
DIFFERENTIAL METHOD BLD: NORMAL
EOSINOPHIL # BLD AUTO: 0.1 10E9/L (ref 0–0.7)
EOSINOPHIL NFR BLD AUTO: 1.6 %
ERYTHROCYTE [DISTWIDTH] IN BLOOD BY AUTOMATED COUNT: 13.5 % (ref 10–15)
GFR SERPL CREATININE-BSD FRML MDRD: 41 ML/MIN/{1.73_M2}
GLUCOSE SERPL-MCNC: 187 MG/DL (ref 70–99)
GLUCOSE UR STRIP-MCNC: NEGATIVE MG/DL
HCT VFR BLD AUTO: 38.9 % (ref 35–47)
HGB BLD-MCNC: 12.9 G/DL (ref 11.7–15.7)
HGB UR QL STRIP: NEGATIVE
IMM GRANULOCYTES # BLD: 0 10E9/L (ref 0–0.4)
IMM GRANULOCYTES NFR BLD: 0.2 %
INTERPRETATION ECG - MUSE: NORMAL
KETONES UR STRIP-MCNC: NEGATIVE MG/DL
LEUKOCYTE ESTERASE UR QL STRIP: ABNORMAL
LYMPHOCYTES # BLD AUTO: 1.7 10E9/L (ref 0.8–5.3)
LYMPHOCYTES NFR BLD AUTO: 35.4 %
MAGNESIUM SERPL-MCNC: 2.2 MG/DL (ref 1.6–2.3)
MCH RBC QN AUTO: 28.9 PG (ref 26.5–33)
MCHC RBC AUTO-ENTMCNC: 33.2 G/DL (ref 31.5–36.5)
MCV RBC AUTO: 87 FL (ref 78–100)
MONOCYTES # BLD AUTO: 0.5 10E9/L (ref 0–1.3)
MONOCYTES NFR BLD AUTO: 9.8 %
MUCOUS THREADS #/AREA URNS LPF: PRESENT /LPF
NEUTROPHILS # BLD AUTO: 2.6 10E9/L (ref 1.6–8.3)
NEUTROPHILS NFR BLD AUTO: 52.6 %
NITRATE UR QL: NEGATIVE
NRBC # BLD AUTO: 0 10*3/UL
NRBC BLD AUTO-RTO: 0 /100
PH UR STRIP: 7 PH (ref 5–7)
PHOSPHATE SERPL-MCNC: 3.2 MG/DL (ref 2.5–4.5)
PLATELET # BLD AUTO: 234 10E9/L (ref 150–450)
POTASSIUM SERPL-SCNC: 3.5 MMOL/L (ref 3.4–5.3)
PROT SERPL-MCNC: 7.3 G/DL (ref 6.8–8.8)
RBC # BLD AUTO: 4.46 10E12/L (ref 3.8–5.2)
RBC #/AREA URNS AUTO: 1 /HPF (ref 0–2)
SODIUM SERPL-SCNC: 141 MMOL/L (ref 133–144)
SOURCE: ABNORMAL
SP GR UR STRIP: 1.02 (ref 1–1.03)
SQUAMOUS #/AREA URNS AUTO: 1 /HPF (ref 0–1)
UROBILINOGEN UR STRIP-MCNC: NORMAL MG/DL (ref 0–2)
WBC # BLD AUTO: 4.9 10E9/L (ref 4–11)
WBC #/AREA URNS AUTO: 10 /HPF (ref 0–5)

## 2020-07-14 PROCEDURE — 83735 ASSAY OF MAGNESIUM: CPT | Performed by: EMERGENCY MEDICINE

## 2020-07-14 PROCEDURE — 70450 CT HEAD/BRAIN W/O DYE: CPT

## 2020-07-14 PROCEDURE — 25800030 ZZH RX IP 258 OP 636: Performed by: EMERGENCY MEDICINE

## 2020-07-14 PROCEDURE — 81001 URINALYSIS AUTO W/SCOPE: CPT | Performed by: EMERGENCY MEDICINE

## 2020-07-14 PROCEDURE — 80053 COMPREHEN METABOLIC PANEL: CPT | Performed by: EMERGENCY MEDICINE

## 2020-07-14 PROCEDURE — 87086 URINE CULTURE/COLONY COUNT: CPT | Performed by: EMERGENCY MEDICINE

## 2020-07-14 PROCEDURE — 25000125 ZZHC RX 250: Performed by: EMERGENCY MEDICINE

## 2020-07-14 PROCEDURE — 25000128 H RX IP 250 OP 636: Performed by: EMERGENCY MEDICINE

## 2020-07-14 PROCEDURE — 96361 HYDRATE IV INFUSION ADD-ON: CPT

## 2020-07-14 PROCEDURE — 93005 ELECTROCARDIOGRAM TRACING: CPT

## 2020-07-14 PROCEDURE — 70498 CT ANGIOGRAPHY NECK: CPT

## 2020-07-14 PROCEDURE — 96360 HYDRATION IV INFUSION INIT: CPT | Mod: 59

## 2020-07-14 PROCEDURE — 99285 EMERGENCY DEPT VISIT HI MDM: CPT | Mod: 25

## 2020-07-14 PROCEDURE — 85025 COMPLETE CBC W/AUTO DIFF WBC: CPT | Performed by: EMERGENCY MEDICINE

## 2020-07-14 PROCEDURE — 84100 ASSAY OF PHOSPHORUS: CPT | Performed by: EMERGENCY MEDICINE

## 2020-07-14 RX ORDER — IOPAMIDOL 755 MG/ML
70 INJECTION, SOLUTION INTRAVASCULAR ONCE
Status: COMPLETED | OUTPATIENT
Start: 2020-07-14 | End: 2020-07-14

## 2020-07-14 RX ADMIN — SODIUM CHLORIDE 90 ML: 9 INJECTION, SOLUTION INTRAVENOUS at 20:16

## 2020-07-14 RX ADMIN — IOPAMIDOL 70 ML: 755 INJECTION, SOLUTION INTRAVENOUS at 20:16

## 2020-07-14 RX ADMIN — SODIUM CHLORIDE 1000 ML: 9 INJECTION, SOLUTION INTRAVENOUS at 22:13

## 2020-07-14 ASSESSMENT — ENCOUNTER SYMPTOMS
WEAKNESS: 1
DIZZINESS: 1
HEADACHES: 0

## 2020-07-14 NOTE — TELEPHONE ENCOUNTER
Caller  (Sonal) called for mother in law that has been having severe dizziness, especially when she bends over for a day and  it is getting worse.  Per caller this is new and sudden. States that pt has an appointment on 7/27 with her primary.  Caller states that patient is diabetic, but blood glucose values are okay.  Advised to go to emergency department per protocol.  Kami Harkins RN   COVID 19 Nurse Triage Plan/Patient Instructions    Please be aware that novel coronavirus (COVID-19) may be circulating in the community. If you develop symptoms such as fever, cough, or SOB or if you have concerns about the presence of another infection including coronavirus (COVID-19), please contact your health care provider or visit [email or web address].     Disposition/Instructions    ED Visit recommended. Follow protocol based instructions.     Bring Your Own Device:  Please also bring your smart device(s) (smart phones, tablets, laptops) and their charging cables for your personal use and to communicate with your care team during your visit.    Thank you for taking steps to prevent the spread of this virus.  o Limit your contact with others.  o Wear a simple mask to cover your cough.  o Wash your hands well and often.    Resources    [facility]: About COVID-19: [email or web address]    CDC: What to Do If You're Sick: www.cdc.gov/coronavirus/2019-ncov/about/steps-when-sick.html    CDC: Ending Home Isolation: www.cdc.gov/coronavirus/2019-ncov/hcp/disposition-in-home-patients.html     CDC: Caring for Someone: www.cdc.gov/coronavirus/2019-ncov/if-you-are-sick/care-for-someone.html     [facility]: Interim Guidance for Hospital Discharge to Home: www.health.[facility].mn.us/diseases/coronavirus/hcp/hospdischarge.pdf    [facility] clinical [facility] (COVID-19 research studies): [email or web address]     Below are the COVID-19 hotlines at the [place] Department of [facility] ([facility]). Interpreters are  available.   o For health questions: Call 299-161-6241 or 1-818.798.7205 (7 a.m. to 7 p.m.)  o For questions about schools and childcare: Call 512-061-1578 or 1-453.616.7355 (7 a.m. to 7 p.m.)                     Reason for Disposition    [1] Dizziness (vertigo) present now AND [2] age > 60  (Exception: prior physician evaluation for this AND no different/worse than usual)    Additional Information    Negative: [1] Weakness (i.e., paralysis, loss of muscle strength) of the face, arm or leg on one side of the body AND [2] sudden onset AND [3] present now    Negative: [1] Numbness (i.e., loss of sensation) of the face, arm or leg on one side of the body AND [2] sudden onset AND [3] present now    Negative: [1] Loss of speech or garbled speech AND [2] sudden onset AND [3] present now    Negative: Difficult to awaken or acting confused (e.g., disoriented, slurred speech)    Negative: Sounds like a life-threatening emergency to the triager    Negative: Followed a head injury    Negative: Followed an ear injury    Negative: Localized weakness or numbness is main symptom    Negative: Dizziness relates to riding in a car, going to an amusement park, etc.    Negative: [1] Dizziness is main symptom AND [2] NO spinning sensation (i.e., vertigo)    Protocols used: DIZZINESS - VERTIGO-A-

## 2020-07-14 NOTE — ED AVS SNAPSHOT
Emergency Department  [address] [facility] [address]  Phone:  [phone]  Fax:  [phone]                                    Chapis Riggs   MRN: [id number]    Department:   Emergency Department   Date of Visit:  7/14/2020           After Visit Summary Signature Page    I have received my discharge instructions, and my questions have been answered. I have discussed any challenges I see with this plan with the nurse or doctor.    ..........................................................................................................................................  Patient/Patient Representative Signature      ..........................................................................................................................................  Patient Representative Print Name and Relationship to Patient    ..................................................               ................................................  Date                                   Time    ..........................................................................................................................................  Reviewed by Signature/Title    ...................................................              ..............................................  Date                                               Time          22EPIC Rev 08/18

## 2020-07-14 NOTE — ED TRIAGE NOTES
Pt endorsing dizziness that started when she got up this morning. Eating and drinking normally. Has not fallen.

## 2020-07-15 VITALS
SYSTOLIC BLOOD PRESSURE: 160 MMHG | OXYGEN SATURATION: 99 % | TEMPERATURE: 97.9 F | RESPIRATION RATE: 16 BRPM | DIASTOLIC BLOOD PRESSURE: 70 MMHG | HEART RATE: 66 BPM

## 2020-07-15 NOTE — ED PROVIDER NOTES
History     Chief Complaint:  Dizziness    HPI   Chapis Riggs is a 89 year old female with a history of diabetes who presents with dizziness.  Yesterday, the patient woke up around 0400 and when she got out of bed she felt weak and dizzy.  She reports that she got up quickly out of bed and she had to get to the bathroom quickly to urinate.  She had similar sensations today when she bent over. The relative the patient presented with noted the patient usually never uses the railing while walking up the stairs, but today seemed off balance and wasn't walking straight. Additionally, the patient seemed to not be thinking clearly. The patient doesn't notice one specific side she is dizzy on. They called a nurse line which recommended she come to the emergency department. The patient states she is not dizzy while resting.  she denies recent fall or injury, headache, change in vision, chest pain or discomfort, no abdominal pain, weakness in arms or legs, urinary difficulty, change in urinary frequency, dysuria, blood in stool, ear pain or tinnitus.  No cough, syncope, shortness of breath.  She denies any other symptoms at this time.    Allergies:  Atorvastatin  Lisinopril  Sulfa drugs      Medications:    Amlodipine  Aspirin 81 mg  Bisoprolol  Hydrodiuril  Cozaar  Nitrostat    Past Medical History:    Contact dermatitis  Diverticulitis  Hypertension  Type II diabetes   Vitiligo  Hyperlipidemia   Arthritis  Chronic midline back pain    Past Surgical History:    Anal papilla  Breast surgery, biopsy  Hysterectomy/tubal  Bilateral upper ptosis repair    Family History:    Diabetes  GI cancer    Social History:  Smoking status: Never  Alcohol use: No  Drug use: No  Patient presents with relative.  PCP: Christiano Carvalho    Marital Status:       Review of Systems   HENT: Negative for ear pain and tinnitus.    Eyes: Negative for visual disturbance.   Neurological: Positive for dizziness and weakness. Negative for  headaches.   All other systems reviewed and are negative.    Physical Exam     Patient Vitals for the past 24 hrs:   BP Temp Temp src Pulse Heart Rate Resp SpO2   07/14/20 2330 (!) 160/76 -- -- 70 73 14 100 %   07/14/20 2210 (!) 144/80 -- -- 69 69 22 100 %   07/14/20 1945 (!) 160/70 -- -- 66 66 14 100 %   07/14/20 1733 (!) 184/83 97.9  F (36.6  C) Temporal 66 -- 16 98 %      Physical Exam  General: Well appearing, nontoxic. Resting comfortably  Head:  Scalp, face, and head appear normal. Atraumatic.  Eyes:  Pupils are equal, round, and reactive to light, EOMI, no nystagmus     Conjunctivae non-injected and sclerae white  ENT:    The external nose is normal    Pinnae are normal    The oropharynx is normal, mucous membranes moist    Uvula is in the midline  Neck:  Normal range of motion    There is no rigidity noted    Trachea is in the midline  CV:  Regular rate and rhythm     Normal S1/S2, no S3/S4    2/6 systolic murmur LSB. No rub. Radial pulses 2+ bilaterally   Resp:  Lungs are clear and equal bilaterally    There is no tachypnea    No increased work of breathing    No rales, wheezing, or rhonchi  GI:  Abdomen is soft, no rigidity or guarding    No distension, or mass    No tenderness or rebound tenderness   MS:  Normal muscular tone    Symmetric motor strength    No lower extremity edema  Skin:  No rash or acute skin lesions noted  Neuro: A&Ox3, GCS 15    CN II - XII intact    Speech clear, fluent, and normal    Strength 5/5 and symmetric in bilateral upper and lower extremities.    No pronator drift. No leg drift. SILT throughout.    No ankle clonus    FTN testing normal. Mild ataxia on right heel to shin testing. No tremor.     No meningismus   Psych:  Normal affect.  Appropriate interactions.    Emergency Department Course   ECG:  @ 1739  Indication: 74  Vent. Rate 74 bpm. WA interval 170 ms. QRS duration 74 ms. QT/QTc 410/455 ms. P-R-T axis 50 53 40.   Normal sinus rhythm. Possible left atrial enlargement.  Borderline ECG.  Read @ 1859 by Dr. Carlton.     Imaging:  CT-scan Head w/o contrast:  IMPRESSION:     1. No evidence of acute intracranial hemorrhage, mass, or herniation.  2. Diffuse parenchymal volume loss and white matter changes likely due  to chronic microvascular ischemic disease, as described.  Reading per radiology.     CTA Head Neck w/ Contrast  IMPRESSION:  1. No definite flow-limiting stenosis of the major craniocervical  arterial vasculature.  2. Mild to moderate focal stenosis of the proximal V1 segment of the  right vertebral artery due to mixed atherosclerotic disease.  3. Mild atherosclerotic disease of the bilateral carotid bifurcations  and carotid siphons. No high-grade stenosis or occlusion.  Reading per radiology.     Radiographic findings were communicated with the patient and family who voiced understanding of the findings.    Laboratory:  CBC: WBC 4.9, HGB 12.9,      CMP: Glucose 187 (H), Creatinine: 1.13 (H), GFR: 47 (L), o/w WNL    Magnesium: 2.2    Phosphorus: 3.2    UA reflex to Microscopic and Culture: Clear yellow urine, Protein albumin: 10, Leukocyte: Large, WBC: 10 (H), Mucous: Present    Urine Culture Aerobic Bacterial: In process    Interventions:  2213 0.9% Sodium Chloride BOLUS 1000 mLs IV       Emergency Department Course:  1915 Nursing notes and vitals reviewed. I performed an exam of the patient as documented above.     EKG was done, interpretation as above.    IV inserted. Medicine administered as documented above. Blood drawn. This was sent to the lab for further testing, results above.    The patient was sent for a head CT and head neck CTA while in the emergency department, findings above.     2116 I rechecked the patient and discussed the results of her workup thus far.     Findings and plan explained to the Patient and family member. Patient discharged home with instructions regarding supportive care, medications, and reasons to return. The importance of close  follow-up was reviewed.    I personally reviewed the laboratory and imaging results with the Patient and family member and answered all related questions prior to discharge.      Impression & Plan    Medical Decision Making:  Chapis Riggs is a robust 89 year old female who presents for evaluation of dizziness and unsteady gait.  Her family member also reports mild cloudy thinking.  On my evaluation she is well-appearing, hemodynamically stable and afebrile.  No focal neurologic deficits.  A broad differential diagnosis is considered including dehydration, urinary tract infection, metabolic disturbance, dysrhythmia, stroke, intracranial hemorrhage, intracranial mass lesion, vertebral artery dissection or other vascular pathology among many others.  She is not having any chest pain, headache, fevers or other signs or symptoms to suggest other infection.  Work-up in the emergency department is thankfully reassuring.  CBC is normal.  No anemia.  Electrolytes are normal.  Creatinine is at baseline.  Urinalysis reveals 10 WBCs but negative nitrites.  Given that she is not having any dysuria or other urinary symptoms I would not treat empirically for UTI at this time.  Will await culture results.  CT scan of the head and CTA of the head and neck was obtained which does not reveal any evidence of acute vascular or intracranial pathology.  No findings to suggest stroke.  No hypoglycemia.  Patient was treated with IV fluids and felt significantly improved.  Ultimately her dizziness is likely secondary to mild dehydration.  She was able to ambulate in the ED without difficulty.  At this time there is no indication for hospitalization.  The patient felt much improved and was comfortable with discharge to home with close outpatient follow-up.  The patient was agreeable with the plan of care.  Close return precautions were provided and she was discharged in stable and improved condition.     Diagnosis:    ICD-10-CM    1.  Dizziness  R42        Disposition:  discharged to home    Hazel Mitchell  7/14/2020    EMERGENCY DEPARTMENT    Scribe Disclosure:  I, Hazel Mitchell, am serving as a scribe at 7:15 PM on 7/14/2020 to document services personally performed by Ricardo Carlton MD based on my observations and the provider's statements to me.         Ricardo Carlton MD  07/15/20 0030

## 2020-07-16 LAB
BACTERIA SPEC CULT: NORMAL
Lab: NORMAL
SPECIMEN SOURCE: NORMAL

## 2020-07-17 NOTE — RESULT ENCOUNTER NOTE
Final urine culture report is NEGATIVE per [facility] ED Lab Result protocol.    If NEGATIVE result, no change in treatment, per [facility] ED Lab Result protocol.

## 2020-07-29 ENCOUNTER — OFFICE VISIT (OUTPATIENT)
Dept: FAMILY MEDICINE | Facility: CLINIC | Age: 85
End: 2020-07-29
Payer: COMMERCIAL

## 2020-07-29 VITALS
TEMPERATURE: 98.9 F | OXYGEN SATURATION: 98 % | HEART RATE: 82 BPM | SYSTOLIC BLOOD PRESSURE: 160 MMHG | DIASTOLIC BLOOD PRESSURE: 70 MMHG | WEIGHT: 119 LBS | BODY MASS INDEX: 21.08 KG/M2

## 2020-07-29 DIAGNOSIS — R42 DIZZINESS: Primary | ICD-10-CM

## 2020-07-29 DIAGNOSIS — Z79.4 TYPE 2 DIABETES MELLITUS WITHOUT COMPLICATION, WITH LONG-TERM CURRENT USE OF INSULIN (H): ICD-10-CM

## 2020-07-29 DIAGNOSIS — E11.9 TYPE 2 DIABETES MELLITUS WITHOUT COMPLICATION, WITH LONG-TERM CURRENT USE OF INSULIN (H): ICD-10-CM

## 2020-07-29 DIAGNOSIS — R41.89 COGNITIVE IMPAIRMENT: ICD-10-CM

## 2020-07-29 DIAGNOSIS — I10 ESSENTIAL HYPERTENSION WITH GOAL BLOOD PRESSURE LESS THAN 130/80: ICD-10-CM

## 2020-07-29 PROCEDURE — 99214 OFFICE O/P EST MOD 30 MIN: CPT | Performed by: INTERNAL MEDICINE

## 2020-07-29 NOTE — PROGRESS NOTES
"Subjective     [place] Riggs is a 89 year old female who presents to clinic today for the following health issues:    HPI       ED/UC Followup:    Facility:   ED  Date of visit: 7/14/20  Reason for visit: dizziness  Current Status: discharged to home     F/u ER visit.        Extensive work up. Reviewed and d/w pt and her family.            Pt has impaired cognition and somewhat of a euphoric affect.                      She still lives alone, and her non-air conditioned home.               Her son reports it can get somewhat warm in the home.      It was very hot when she was dizzy and was taken to the emergency room.  They gave her some IV fluids and sent her home after an extensive work-up.   No major problems were found.  Here with her son and her dtr in law.             Not dizzy again over the past 2 wks, since her ER visit, at least not that she can recall..                             She does not recall her glucose readings.           \" I can always tell when I am low\".                  She has questions regarding her insulin delivery pen.  It took some time to explain some of its features.           She uses 8 units in the morning and 6 units in the p.m.                                      Her family is trying to let her stay in her home as long as it seems safe for her to do so.  She has been adamant that she does not want to leave.          Patient Active Problem List    Diagnosis Date Noted     Coronary artery disease involving native heart with angina pectoris, unspecified vessel or lesion type (H) 03/25/2019     Priority: High     Pravastatin stopped by dtr in 7/19       Type 2 diabetes mellitus without complication, with long-term current use of insulin (H) 08/25/2018     Priority: High     CAD (coronary artery disease) 03/24/2014     Priority: High     Mild per 2009 evaluation                          Pravastatin stopped by dtr in 7/19       History of colonic polyps 01/30/2014     Priority: " High     2004; a 2.5 cm polyp at 25 cm removed by Dr. Fabian; a full colonoscopy was done.                  In 7/09, he was not able to do a full colonoscopy as pt did not tolerate the exam.    Neg to mid-transverse colon.        In 9/17, flex sig neg to 45 cm           Type 2 diabetes mellitus without complication (H) 01/28/2014     Priority: High     Managed by ;        Dx approx 2003.   Rx insulin from the beginning.                               A1C 8.0 in 5/14; TSH  nml       Other and unspecified angina pectoris 10/26/2012     Priority: High     2009; nuc med adenosine stress suggests small area of anterior ischemia; medical Rx advised.     Stress echo 10/12 showed some LVH, nml EF, no wall motion abnormalities with exercise.                          Uses NTG rarely.          Normal ECG in 3/14       Essential hypertension with goal blood pressure less than 130/80 10/26/2012     Priority: High     Memory loss 10/26/2012     Priority: High      B12 and thyroid levels normal in 2014       Hyperlipidemia LDL goal <100 10/25/2012     Priority: High     Pravastatin stopped by dtr in 7/19       Herpes zoster without complication 01/13/2020     Priority: Medium     R L1 dermatome       Axillary adenopathy 01/13/2020     Priority: Medium     Pain of right lower leg 07/23/2018     Priority: Medium     Chronic midline low back pain without sciatica 08/28/2017     Priority: Medium     Epistaxis 11/28/2016     Priority: Medium     Cough 08/25/2014     Priority: Medium     CXR neg in 11/15 and 5/16       Ganglion cyst 03/24/2014     Priority: Medium     L hand and wrist               In 8/18, medial aspect R great toe       Arthritis 01/29/2014     Priority: Medium     Sx of; takes aleve rarely   ;       See imaging 2018; both hips, and lumbar spine       Diverticulitis of colon 10/26/2012     Priority: Medium     Problem list name updated by automated process. Provider to review       ACP (advance care planning)  11/03/2015     Priority: Low     Advance Care Planning 11/3/2015: ACP Review and Resources Provided:  Reviewed chart for advance care plan.  Chapis Riggs has no plan or code status on file. Discussed available resources and provided with information. Added by Su Francis             Preventive measure 01/28/2014     Priority: Low     Colonoscopy only to mid-transverse colon in 7/09  Mammogram 8/12, neg;breast exam 10/16;7/18 1/20                        Pap smear 1/03  DXA 2005; results???                                     Pre-visit report from [facility] reviewed       Past Surgical History:   Procedure Laterality Date     ANAL PAPILLA      PROLAPSED     BREAST SURGERY      BIOPSY     GYN SURGERY      HYSTEROSCOPY/TUBAL     REPAIR PTOSIS BILATERAL  11/8/2011    Procedure:REPAIR PTOSIS BILATERAL; BILATERAL UPPER LID PTOSIS REPAIR; Surgeon:SALINAS DUNN; Location:[facility]     SIGMOIDOSCOPY FLEXIBLE N/A 9/6/2017    Procedure: SIGMOIDOSCOPY FLEXIBLE;  SIGMOIDOSCOPY FLEXIBLE;  Surgeon: Thang Begum MD;  Location:  GI     Social History     Socioeconomic History     Marital status:      Spouse name: Not on file     Number of children: 5     Years of education: Not on file     Highest education level: Not on file   Occupational History     Employer: RETIRED   Social Needs     Financial resource strain: Not on file     Food insecurity     Worry: Not on file     Inability: Not on file     Transportation needs     Medical: Not on file     Non-medical: Not on file   Tobacco Use     Smoking status: Never Smoker     Smokeless tobacco: Never Used   Substance and Sexual Activity     Alcohol use: No     Drug use: No     Sexual activity: Not Currently   Lifestyle     Physical activity     Days per week: Not on file     Minutes per session: Not on file     Stress: Not on file   Relationships     Social connections     Talks on phone: Not on file     Gets together: Not on file     Attends Synagogue service: Not  on file     Active member of club or organization: Not on file     Attends meetings of clubs or organizations: Not on file     Relationship status: Not on file     Intimate partner violence     Fear of current or ex partner: Not on file     Emotionally abused: Not on file     Physically abused: Not on file     Forced sexual activity: Not on file   Other Topics Concern     Parent/sibling w/ CABG, MI or angioplasty before 65F 55M? No   Social History Narrative    1 son is ; cancer;                    3 dtrs,1 living son     Immunization History   Administered Date(s) Administered     Influenza (High Dose) 3 valent vaccine 2012, 2013, 10/27/2014, 10/24/2016, 2017, 2019     Influenza (IIV3) PF 10/27/2008, 2010, 10/14/2010     Pneumo Conj 13-V (2010&after) 2015     Pneumococcal 23 valent 2008     TD (ADULT, 7+) 10/24/2007       Current Outpatient Medications   Medication Sig Dispense Refill     amLODIPine (NORVASC) 10 MG tablet Take 1 tablet (10 mg) by mouth daily 90 tablet 3     aspirin 81 MG tablet Take 1 tablet by mouth daily.       bisoprolol (ZEBETA) 10 MG tablet TAKE TWO TABLETS BY MOUTH DAILY 180 tablet 2     blood glucose (ONETOUCH ULTRA) test strip Pt tests 4 x / day 400 strip 1     hydrochlorothiazide (HYDRODIURIL) 25 MG tablet TAKE 1 TABLET BY MOUTH EVERY DAY 90 tablet 1     KLOR-CON 10 MEQ CR tablet TAKE 1 TABLET (10 MEQ) BY MOUTH EVERY OTHER DAY 45 tablet 2     losartan (COZAAR) 100 MG tablet TAKE 1 TABLET BY MOUTH EVERY DAY 90 tablet 4     Multiple Vitamins-Minerals (CENTRUM SILVER PO) Take 1 tablet by mouth.       nitroGLYcerin (NITROSTAT) 0.4 MG sublingual tablet Place 1 tablet (0.4 mg) under the tongue every 5 minutes as needed for chest pain 25 tablet 3     NOVOLOG MIX 70/30 FLEXPEN (70-30) 100 UNIT/ML susp INJECT 8 UNITS SUBCUTANEOUSLY EVERY MORNING, INJECT 5 UNITS EVERY EVENING, 15-30 MIN BEFORE MEAL 15 mL 11     Allergies   Allergen Reactions      Atorvastatin      Dizzy and weak     Lisinopril-Hctz Cough     Sulfa Drugs      BP Readings from Last 3 Encounters:   07/29/20 (!) 160/70   07/15/20 (!) 160/70   01/27/20 136/68    Wt Readings from Last 3 Encounters:   07/29/20 54 kg (119 lb)   01/27/20 51.3 kg (113 lb)   01/13/20 51.3 kg (113 lb)                    Reviewed and updated as needed this visit by Provider         Review of Systems   CONSTITUTIONAL:NEGATIVE for fever, chills, change in weight  RESP:NEGATIVE for significant cough or SOB  CV: NEGATIVE for chest pain, palpitations or peripheral edema  NEURO: NEGATIVE for dysarthria, loss of consciousness and vertigo      Objective    BP (!) 160/70   Pulse 82   Temp 98.9  F (37.2  C) (Tympanic)   Wt 54 kg (119 lb)   SpO2 98%   BMI 21.08 kg/m    Body mass index is 21.08 kg/m .  Physical Exam   GENERAL APPEARANCE: alert and no distress  RESP: lungs clear to auscultation - no rales, rhonchi or wheezes  CV: regular rates and rhythm, normal S1 S2, no S3 or S4 and no murmur, click or rub  NEURO: Normal strength and tone, speech normal and impaired memory  PSYCH: jocular affect    Diagnostic Test Results:  Recent Results (from the past 1000 hour(s))   EKG 12 lead    Collection Time: 07/14/20  5:39 PM   Result Value Ref Range    Interpretation ECG Click View Image link to view waveform and result    CBC with platelets differential    Collection Time: 07/14/20  7:33 PM   Result Value Ref Range    WBC 4.9 4.0 - 11.0 10e9/L    RBC Count 4.46 3.8 - 5.2 10e12/L    Hemoglobin 12.9 11.7 - 15.7 g/dL    Hematocrit 38.9 35.0 - 47.0 %    MCV 87 78 - 100 fl    MCH 28.9 26.5 - 33.0 pg    MCHC 33.2 31.5 - 36.5 g/dL    RDW 13.5 10.0 - 15.0 %    Platelet Count 234 150 - 450 10e9/L    Diff Method Automated Method     % Neutrophils 52.6 %    % Lymphocytes 35.4 %    % Monocytes 9.8 %    % Eosinophils 1.6 %    % Basophils 0.4 %    % Immature Granulocytes 0.2 %    Nucleated RBCs 0 0 /100    Absolute Neutrophil 2.6 1.6 - 8.3 10e9/L     Absolute Lymphocytes 1.7 0.8 - 5.3 10e9/L    Absolute Monocytes 0.5 0.0 - 1.3 10e9/L    Absolute Eosinophils 0.1 0.0 - 0.7 10e9/L    Absolute Basophils 0.0 0.0 - 0.2 10e9/L    Abs Immature Granulocytes 0.0 0 - 0.4 10e9/L    Absolute Nucleated RBC 0.0    Comprehensive metabolic panel    Collection Time: 07/14/20  7:33 PM   Result Value Ref Range    Sodium 141 133 - 144 mmol/L    Potassium 3.5 3.4 - 5.3 mmol/L    Chloride 104 94 - 109 mmol/L    Carbon Dioxide 30 20 - 32 mmol/L    Anion Gap 7 3 - 14 mmol/L    Glucose 187 (H) 70 - 99 mg/dL    Urea Nitrogen 23 7 - 30 mg/dL    Creatinine 1.13 (H) 0.52 - 1.04 mg/dL    GFR Estimate 41 (L) >60 mL/min/[1.73_m2]    GFR Estimate If Black 47 (L) >60 mL/min/[1.73_m2]    Calcium 9.5 8.5 - 10.1 mg/dL    Bilirubin Total 0.4 0.2 - 1.3 mg/dL    Albumin 3.8 3.4 - 5.0 g/dL    Protein Total 7.3 6.8 - 8.8 g/dL    Alkaline Phosphatase 81 40 - 150 U/L    ALT 21 0 - 50 U/L    AST 20 0 - 45 U/L   Magnesium    Collection Time: 07/14/20  7:33 PM   Result Value Ref Range    Magnesium 2.2 1.6 - 2.3 mg/dL   Phosphorus    Collection Time: 07/14/20  7:33 PM   Result Value Ref Range    Phosphorus 3.2 2.5 - 4.5 mg/dL   UA reflex to Microscopic and Culture    Collection Time: 07/14/20  7:33 PM    Specimen: Midstream Urine   Result Value Ref Range    Color Urine Yellow     Appearance Urine Clear     Glucose Urine Negative NEG^Negative mg/dL    Bilirubin Urine Negative NEG^Negative    Ketones Urine Negative NEG^Negative mg/dL    Specific Gravity Urine 1.021 1.003 - 1.035    Blood Urine Negative NEG^Negative    pH Urine 7.0 5.0 - 7.0 pH    Protein Albumin Urine 10 (A) NEG^Negative mg/dL    Urobilinogen mg/dL Normal 0.0 - 2.0 mg/dL    Nitrite Urine Negative NEG^Negative    Leukocyte Esterase Urine Large (A) NEG^Negative    Source Midstream Urine     RBC Urine 1 0 - 2 /HPF    WBC Urine 10 (H) 0 - 5 /HPF    Squamous Epithelial /HPF Urine 1 0 - 1 /HPF    Mucous Urine Present (A) NEG^Negative /LPF   Urine  Culture Aerobic Bacterial    Collection Time: 07/14/20  7:33 PM    Specimen: Midstream Urine   Result Value Ref Range    Specimen Description Midstream Urine     Special Requests Specimen received in preservative     Culture Micro       10,000 to 50,000 colonies/mL  mixed urogenital colette  Susceptibility testing not routinely done       Recent Results (from the past 744 hour(s))   CT Head w/o Contrast    Narrative    CT SCAN OF THE HEAD WITHOUT CONTRAST   7/14/2020 9:01 PM     HISTORY: Acute dizziness/gait instability.    TECHNIQUE: Axial images of the head and coronal reformations without  IV contrast material. Radiation dose for this scan was reduced using  automated exposure control, adjustment of the mA and/or kV according  to patient size, or iterative reconstruction technique.    COMPARISON: None.    FINDINGS: There is no evidence of intracranial hemorrhage, mass, acute  infarct or anomaly. Moderate global brain parenchymal volume loss.  Moderate scattered patchy hypoattenuation within the periventricular  and deep cerebral white matter, likely sequela of chronic small vessel  ischemic disease.     Bilateral lens replacements. Visualized orbits are otherwise normal.  Trace mucosal thickening in the ethmoid air cells. The visualized  aspects of the mastoid and middle ear cavities are clear bilaterally.  The bony calvarium and bones of the skull base appear intact.       Impression    IMPRESSION:     1. No evidence of acute intracranial hemorrhage, mass, or herniation.  2. Diffuse parenchymal volume loss and white matter changes likely due  to chronic microvascular ischemic disease, as described.      THO WRIGHT MD   CTA Head Neck with Contrast    Narrative    CT ANGIOGRAM OF THE HEAD AND NECK WITH CONTRAST  7/14/2020 9:01 PM     HISTORY: Acute dizziness/gait instability.    TECHNIQUE: CT angiography with an injection of 70mL [id number]-[id number] IV with  scans through the head and neck. Images were transferred to a  separate  3-D workstation where multiplanar reformations and 3-D images were  created. Estimates of carotid stenoses are made relative to the distal  internal carotid artery diameters except as noted. Radiation dose for  this scan was reduced using automated exposure control, adjustment of  the mA and/or kV according to patient size, or iterative  reconstruction technique.    COMPARISON: CT head of same date.     CT HEAD FINDINGS: No contrast enhancing lesions. Cerebral blood flow  is grossly normal.     CT ANGIOGRAM HEAD FINDINGS: Mild stenosis involving the carotid  siphons bilaterally related to calcified atherosclerotic disease.  There is no high-grade/flow-limiting stenosis of the major proximal  intracranial arterial vasculature. Mild to moderate focal stenosis of  the ambient segment of the right posterior cerebral artery. Mild  scattered luminal irregularity of the intracranial vessels elsewhere  without significant stenosis. No aneurysm or high flow vascular  malformation.    CT ANGIOGRAM NECK FINDINGS:   Mild scattered atherosclerotic calcification in the aortic arch  without significant stenosis at the origins of the great vessels.     Right carotid artery: The right common and internal carotid arteries  are patent. Mild atherosclerotic disease at the carotid bifurcation  and proximal internal carotid artery without significant stenosis by  NASCET criteria.     Left carotid artery: The left common and internal carotid arteries are  patent. Mild atherosclerotic disease at the carotid bifurcation and  proximal internal carotid artery without significant stenosis by  NASCET criteria.     Vertebral arteries: There is mild to moderate focal stenosis of the  proximal V1 segment of right vertebral artery secondary to mixed  atherosclerotic disease (series 9 image 117). Otherwise, the bilateral  cervical vertebral arteries are widely patent.    Other findings: Minimal subcentimeter hypodensities within the  thyroid  gland without dominant nodule or mass. Mild presumed nodular scarring  in the lung apices. Multilevel degenerative disc disease and  uncovertebral facet arthropathy in the cervical and upper thoracic  spine.       Impression    IMPRESSION:  1. No definite flow-limiting stenosis of the major craniocervical  arterial vasculature.  2. Mild to moderate focal stenosis of the proximal V1 segment of the  right vertebral artery due to mixed atherosclerotic disease.  3. Mild atherosclerotic disease of the bilateral carotid bifurcations  and carotid siphons. No high-grade stenosis or occlusion.    THO WRIGHT MD             Assessment & Plan     Chapis was seen today for dizziness.    Diagnoses and all orders for this visit:    Dizziness    Cognitive impairment    Essential hypertension with goal blood pressure less than 130/80    Type 2 diabetes mellitus without complication, with long-term current use of insulin (H)           Summary and implications:  We reviewed multiple issues.           We reviewed all of the issues on the diagnoses list.                 We have not found any major abnormalities regarding an etiology for her dizziness.  She does not have orthostatic blood pressure changes.  She is not hypotensive.  Her blood pressure actually is a bit high on the systolic side.      Hypoglycemia has not been documented.  She does not use a large dose of insulin.                     30 minute visit,over half counseling and coordination of care.            Moving to assisted living seems indicated.  She is unwilling.    Patient Instructions              Your blood pressure is not low; there is no drop in blood pressure when you stand up.                                                      Try to check your blood sugar when if you feel dizzy again.                   Be sure to drink lots of water in the hot and humid weather.                   Return in about 4 months (around 11/29/2020) for follow up of  several issues.    Christiano Carvalho MD  [facility]

## 2020-07-29 NOTE — PATIENT INSTRUCTIONS
Your blood pressure is not low; there is no drop in blood pressure when you stand up.                                                      Try to check your blood sugar when if you feel dizzy again.                   Be sure to drink lots of water in the hot and humid weather.

## 2020-09-14 ENCOUNTER — OFFICE VISIT (OUTPATIENT)
Dept: FAMILY MEDICINE | Facility: CLINIC | Age: 85
End: 2020-09-14
Payer: COMMERCIAL

## 2020-09-14 VITALS
HEART RATE: 63 BPM | DIASTOLIC BLOOD PRESSURE: 74 MMHG | TEMPERATURE: 96.7 F | SYSTOLIC BLOOD PRESSURE: 126 MMHG | WEIGHT: 118 LBS | BODY MASS INDEX: 20.9 KG/M2 | OXYGEN SATURATION: 99 %

## 2020-09-14 DIAGNOSIS — E11.9 TYPE 2 DIABETES MELLITUS WITHOUT COMPLICATION, WITH LONG-TERM CURRENT USE OF INSULIN (H): ICD-10-CM

## 2020-09-14 DIAGNOSIS — R41.89 COGNITIVE IMPAIRMENT: Primary | ICD-10-CM

## 2020-09-14 DIAGNOSIS — R41.3 MEMORY LOSS: ICD-10-CM

## 2020-09-14 DIAGNOSIS — Z79.4 TYPE 2 DIABETES MELLITUS WITHOUT COMPLICATION, WITH LONG-TERM CURRENT USE OF INSULIN (H): ICD-10-CM

## 2020-09-14 LAB — HBA1C MFR BLD: 7.3 % (ref 0–5.6)

## 2020-09-14 PROCEDURE — 99214 OFFICE O/P EST MOD 30 MIN: CPT | Performed by: INTERNAL MEDICINE

## 2020-09-14 PROCEDURE — 36415 COLL VENOUS BLD VENIPUNCTURE: CPT | Performed by: INTERNAL MEDICINE

## 2020-09-14 PROCEDURE — 83036 HEMOGLOBIN GLYCOSYLATED A1C: CPT | Performed by: INTERNAL MEDICINE

## 2020-09-14 NOTE — PATIENT INSTRUCTIONS
Expect a call from [facility].            Your family should be there for the evaluation.                  Keep taking the same medications, as per your current medication list.

## 2020-09-14 NOTE — PROGRESS NOTES
confusionSubjective     Cahpistiffanie Riggs is a 89 year old female who presents to clinic today for the following health issues:    HPI       Concern - confusion  Onset: getting worse  Description:woke up and thought she was somewhere else  Intensity: severe  Progression of Symptoms:  worsening  Accompanying Signs & Symptoms:   Previous history of similar problem: na  Precipitating factors:        Worsened by: na  Alleviating factors:        Improved by: na  Therapies tried and outcome:  none                Here with her son, Gianna Riggs, who lives in [place] with his wife Sonal.                They brought the patient in today because she is been more confused off and on lately with confabulation.        The patient and family are adamant that they want her to remain in her home.             They are interested in a home care evaluation.            I added his phone number and his wife's phone number to the demographic information today.                                        Current Outpatient Medications   Medication Sig Dispense Refill     amLODIPine (NORVASC) 10 MG tablet Take 1 tablet (10 mg) by mouth daily 90 tablet 3     aspirin 81 MG tablet Take 1 tablet by mouth daily.       bisoprolol (ZEBETA) 10 MG tablet TAKE TWO TABLETS BY MOUTH DAILY 180 tablet 2     blood glucose (ONETOUCH ULTRA) test strip Pt tests 4 x / day 400 strip 1     losartan (COZAAR) 100 MG tablet TAKE 1 TABLET BY MOUTH EVERY DAY 90 tablet 4     Multiple Vitamins-Minerals (CENTRUM SILVER PO) Take 1 tablet by mouth.       nitroGLYcerin (NITROSTAT) 0.4 MG sublingual tablet Place 1 tablet (0.4 mg) under the tongue every 5 minutes as needed for chest pain 25 tablet 3     NOVOLOG MIX 70/30 FLEXPEN (70-30) 100 UNIT/ML susp INJECT 8 UNITS SUBCUTANEOUSLY EVERY MORNING, INJECT 5 UNITS EVERY EVENING, 15-30 MIN BEFORE MEAL 15 mL 11       Review of Systems   CONSTITUTIONAL:NEGATIVE for fever, chills, change in weight  RESP:NEGATIVE for significant  cough or SOB  CV: NEGATIVE for chest pain, palpitations or peripheral edema  : negative for dysuria and hematuria       Wt Readings from Last 4 Encounters:   09/14/20 53.5 kg (118 lb)   07/29/20 54 kg (119 lb)   01/27/20 51.3 kg (113 lb)   01/13/20 51.3 kg (113 lb)       Objective    /74   Pulse 63   Temp 96.7  F (35.9  C) (Tympanic)   Wt 53.5 kg (118 lb)   SpO2 99%   BMI 20.90 kg/m    Body mass index is 20.9 kg/m .  Physical Exam   GENERAL APPEARANCE: alert and no distress  RESP: lungs clear to auscultation - no rales, rhonchi or wheezes  CV: regular rates and rhythm, normal S1 S2, no S3 or S4 and no murmur, click or rub  PSYCH: Pleasant, jocular.            Some rambling conversation.    Pending        Assessment & Plan     Chapis was seen today for confusion.    Diagnoses and all orders for this visit:    Cognitive impairment  -     HOME CARE NURSING REFERRAL    Memory loss  -     HOME CARE NURSING REFERRAL    Type 2 diabetes mellitus without complication, with long-term current use of insulin (H)  -     HOME CARE NURSING REFERRAL  -     Hemoglobin A1c               Summary and implications:  We reviewed multiple issues.           We reviewed all of the issues on the diagnoses list.                      This patient needs some additional assistance to remain in her home.  We will start with RN and  evaluations.               They may decide to purchase home health aide services.                              Son and his wife should be there for the home care evaluation.                        25 minute visit,over half counseling and coordination of care    Patient Instructions   Expect a call from [facility].            Your family should be there for the evaluation.                  Keep taking the same medications, as per your current medication list.                   No follow-ups on file.    Christiano Carvalho MD  [facility]  Results for orders  placed or performed in visit on 09/14/20   Hemoglobin A1c     Status: Abnormal   Result Value Ref Range    Hemoglobin A1C 7.3 (H) 0 - 5.6 %

## 2020-09-21 ENCOUNTER — TELEPHONE (OUTPATIENT)
Dept: FAMILY MEDICINE | Facility: CLINIC | Age: 85
End: 2020-09-21

## 2020-09-21 NOTE — TELEPHONE ENCOUNTER
[facility] now requests orders and shares plan of care/discharge summaries for some patients through [employer].  Please REPLY TO THIS MESSAGE OR ROUTE BACK TO THE AUTHOR in order to give authorization for orders when needed.  This is considered a verbal order, you will still receive a faxed copy of orders for signature.  Thank you for your assistance in improving collaboration for our patients.    ORDER  Requesting SN visits 2w1 and 1w2 and 3 PRN for cardiac, skin, home safety, medication education.  OT evaluation and treatment to include cognitive, home, and medication needs.  SW assessment to include community, POLST, and financial resources.

## 2020-09-30 DIAGNOSIS — I10 ESSENTIAL HYPERTENSION WITH GOAL BLOOD PRESSURE LESS THAN 130/80: ICD-10-CM

## 2020-09-30 RX ORDER — HYDROCHLOROTHIAZIDE 25 MG/1
25 TABLET ORAL DAILY
Qty: 90 TABLET | Refills: 1 | Status: SHIPPED | OUTPATIENT
Start: 2020-09-30 | End: 2020-11-10

## 2020-09-30 NOTE — TELEPHONE ENCOUNTER
Routing refill request to provider for review/approval because:  Drug not active on patient's medication list  Labs out of range:  Creat    Creatinine   Date Value Ref Range Status   07/14/2020 1.13 (H) 0.52 - 1.04 mg/dL Final

## 2020-10-01 ENCOUNTER — TELEPHONE (OUTPATIENT)
Dept: FAMILY MEDICINE | Facility: CLINIC | Age: 85
End: 2020-10-01

## 2020-10-01 NOTE — TELEPHONE ENCOUNTER
Reason for Call:  Form, our goal is to have forms completed with 72 hours, however, some forms may require a visit or additional information.    Type of letter, form or note:  medical    Who is the form from?: Home care    Where did the form come from: form was faxed in    What clinic location was the form placed at?: [facility]    Where the form was placed: M[facility] Box/Folder    What number is listed as a contact on the form?: [phone]       Additional comments: [facility]: OT 1 week 1    Call taken on 10/1/2020 at 1:56 PM by Michell Barry

## 2020-10-11 ENCOUNTER — TELEPHONE (OUTPATIENT)
Dept: FAMILY MEDICINE | Facility: CLINIC | Age: 85
End: 2020-10-11

## 2020-10-11 PROBLEM — E11.9 TYPE 2 DIABETES MELLITUS WITHOUT COMPLICATION, WITH LONG-TERM CURRENT USE OF INSULIN (H): Chronic | Status: ACTIVE | Noted: 2018-08-25

## 2020-10-11 PROBLEM — Z79.4 TYPE 2 DIABETES MELLITUS WITHOUT COMPLICATION, WITH LONG-TERM CURRENT USE OF INSULIN (H): Chronic | Status: ACTIVE | Noted: 2018-08-25

## 2020-10-11 PROBLEM — I25.119 CORONARY ARTERY DISEASE INVOLVING NATIVE HEART WITH ANGINA PECTORIS, UNSPECIFIED VESSEL OR LESION TYPE (H): Chronic | Status: ACTIVE | Noted: 2019-03-25

## 2020-10-11 NOTE — TELEPHONE ENCOUNTER
[facility] now requests orders and shares plan of care/discharge summaries for some patients through [employer].  Please REPLY TO THIS MESSAGE OR ROUTE BACK TO THE AUTHOR in order to give authorization for orders when needed.  This is considered a verbal order, you will still receive a faxed copy of orders for signature.  Thank you for your assistance in improving collaboration for our patients.    ORDER  Ok for SN orders for SN visits 2w2 and 2 PRN for additional assessment of home and medication safety and training as patient will be starting new at home health agency this week.  Patient's memory and cognitive function continue to decline and functional tasks as paying bills, prepping simple meals are not able to be completed without hands on assist and cuing. Patient was able to complete these tasks at start of home care services one month ago. Would you recommend additional testing or possible neurology follow ups?

## 2020-10-11 NOTE — TELEPHONE ENCOUNTER
She and her family can certainly have a neurology consult if they want one.      However, note that she is 89 years old, and has several years of worsening cognition already.

## 2020-10-19 DIAGNOSIS — E11.9 TYPE 2 DIABETES, HBA1C GOAL < 8% (H): ICD-10-CM

## 2020-10-19 RX ORDER — BLOOD SUGAR DIAGNOSTIC
STRIP MISCELLANEOUS
Qty: 300 STRIP | Refills: 1 | Status: SHIPPED | OUTPATIENT
Start: 2020-10-19

## 2020-10-26 ENCOUNTER — TELEPHONE (OUTPATIENT)
Dept: FAMILY MEDICINE | Facility: CLINIC | Age: 85
End: 2020-10-26

## 2020-10-26 DIAGNOSIS — Z76.89 ENCOUNTER FOR SUPPORT AND COORDINATION OF TRANSITION OF CARE: Primary | ICD-10-CM

## 2020-10-26 NOTE — TELEPHONE ENCOUNTER
Sonal was called back with provider's message. In person visit scheduled for 11/3/20. [facility] called Sonal already and are coming out on Friday. Dtr with patient now and will see if 6 units BID would be better. They will call pharmacy to see what options they have for pre-filled syringes and medication packets.

## 2020-10-26 NOTE — TELEPHONE ENCOUNTER
Waiting on PCP response about if able to have earlier visit on 11/2 or 11/3. Any recommendation for prefilled insulin injections?     Triage:   Provider update to patient's daughter. 1. Home care should come out Tuesday/Wednesday. 2. Care coordinator referral placed for additional support     Call back from SHELBY Marcial. She states that the nurse who was caring for this patient has been out sick and it appears that orders were not requested prior to her absence. Patient has not been seen in 3 weeks. Per home care manager, they are planning to see patient 1 x week for 3 weeks for medication management and will update orders as needed based on patient needs.    Verbal orders given for:  SN: 1 week for 3 weeks for medication management. Plan to see her Tuesday or Wednesday.    Placed CCRN referral for additional support for patient and family due to lack of compliance.

## 2020-10-26 NOTE — TELEPHONE ENCOUNTER
Reason for Call:  Other call back    Detailed comments:    Sonal Riggs, daughter in law on the consent to communicate called asking for Dr. Carvalho or a nurse to return a call.    Chapis has a pressure sore on her butt that Chapis will not allow the home health care nurse to look at.  They had a home health care for 3 weeks.     Chapis is not appropriately taking her insulin. She is not changing the dial.      Phone Number Patient can be reached at:   Sonal: [phone]    Best Time: any    Can we leave a detailed message on this number? YES    Call taken on 10/26/2020 at 8:01 AM by Lydia Pierce

## 2020-10-26 NOTE — TELEPHONE ENCOUNTER
Ok to use virtual appts for an in person appt.                                   We could simplify the insulin; use 6 units bid for instance.                      They will need to discuss pre-filled syringes with the pharmacy; I have never ordered these.                          She needs 24/7 care.

## 2020-10-26 NOTE — TELEPHONE ENCOUNTER
Pt's dtr Sonal was called back, triage nurse spent 20 minutes on phone call. Pt still lives in home alone and seems to be declining mentally fast. Granddaughter Bindu who is an LPN said pt is doing insulin pen injections without turning the dial, so pt is not getting any insulin. Bindu got pt to set up her meds, but pt said Bindu messed everything up. Pt is to take Novolog mix 8 units in AM and 5 units in PM. Sonal is requesting an Rx for prefilled insulin injectors. Also, triage nurse advised to talk to pharmacy about prefilled medication packets. Pt is not checking her BGs and feels that she is able to tell what her BG's are at. She used to be better at this, but is not anymore. Pt has hard time remembering things such as water even though it's in front of her.     Pt was seen by [facility] for 3 weeks which she accepted fine. They were told pt would qualify for another 3 weeks, but never heard from [facility] again. Sonal tried to call them, but she was told her case was closed. [facility]: [facility] [phone] was called by triage nurse who said pt is still open to skilled nursing. They will have [facility] [place] clinical coordinator look into this and call back to triage nurse and/or Sonal.     Family also hired [facility] care to do daily service checks on patient 1 hr in AM and 1 hr in PM. Pt refused these people into her home. Pt is forgetting what she has agreed on.     Family does call pt several times a day, but unable to be with her daily and pt won't come live with Sonal.      Pt also has a pressure sore on her bottom, but won't let anybody to look at. Pt is able to walk and is very mobile so falls are not a concern.     Sonal would like an earlier OV in person perhaps on Nov 2nd or 3rd, but no openings except virtual. Dr. Carvalho, can those be used for a long in clinic visit?      Future Office Visit:   Next 5 appointments (look out 90 days)    Nov 10, 2020  2:20  PM  Office Visit with Christiano Carvalho MD  [facility] ([facility]) [address] [id number] [phone]

## 2020-10-27 ENCOUNTER — PATIENT OUTREACH (OUTPATIENT)
Dept: CARE COORDINATION | Facility: CLINIC | Age: 85
End: 2020-10-27

## 2020-10-27 NOTE — PROGRESS NOTES
Clinic Care Coordination Contact  [facility]/Voicemail    Referral Source: PCP  Clinical Data: Care Coordinator Outreach  Outreach attempted x 1.  Left message on patient's daughter's voicemail with call back information and requested return call.  Plan:  Care Coordinator will try to reach patient again in 1-2 business days.    Chart Review: Referral from PCP  Reason for Referral: Complex Medical Concerns/Education: Compliance with medical treatment plan  Additional pertinent details: diabetes management     JERONIMO Porras  Clinic Care Coordination  [facility]: [facility]   Phone: [phone]

## 2020-10-28 NOTE — PROGRESS NOTES
Clinic Care Coordination Contact  [facility]/Voicemail     Referral Source: PCP  Clinical Data: Care Coordinator Outreach  Outreach attempted x 2.  Left message on patient's voicemail with call back information and requested return call.  Plan:  Care Coordinator will try to reach patient again in 1-2 business days.     Chart Review: Referral from PCP  Reason for Referral: Complex Medical Concerns/Education: Compliance with medical treatment plan  Additional pertinent details: diabetes management      JERONIMO Porras  Clinic Care Coordination  [facility]: [facility]   Phone: [phone]

## 2020-10-29 ENCOUNTER — TELEPHONE (OUTPATIENT)
Dept: FAMILY MEDICINE | Facility: CLINIC | Age: 85
End: 2020-10-29

## 2020-10-29 ENCOUNTER — PATIENT OUTREACH (OUTPATIENT)
Dept: NURSING | Facility: CLINIC | Age: 85
End: 2020-10-29
Payer: COMMERCIAL

## 2020-10-29 NOTE — LETTER
[facility] CARE COORDINATION  [address] [place]  [address]      October 29, 2020    Chapis Riggs  [address]  [place] [address]      Dear Chapis,    I am a clinic community health worker who works with Christiano Carvalho MD at [facility]. I wanted to introduce myself and provide you with my contact information for you to be able to call me with any questions or concerns. Below is a description of clinic care coordination and how I can further assist you.      The clinic care coordination team is made up of a registered nurse,  and community health worker who understand the health care system. The goal of clinic care coordination is to help you manage your health and improve access to the health care system in the most efficient manner. The team can assist you in meeting your health care goals by providing education, coordinating services, strengthening the communication among your providers and supporting you with any resource needs.    Please feel free to contact me at [phone] with any questions or concerns. We are focused on providing you with the highest-quality healthcare experience possible and that all starts with you.     Sincerely,     JERONIMO Porras  Clinic Care Coordination  [facility]: [facility]  Phone: [phone]

## 2020-10-29 NOTE — PROGRESS NOTES
Clinic Care Coordination Contact  Community Health Worker Initial Outreach  Talked with Patient's daughter in law Sonal DECKER Initial Information Gathering:  Referral Source: PCP     Chart Review: Referral from PCP  Reason for Referral: Complex Medical Concerns/Education: Compliance with medical treatment plan  Additional pertinent details: diabetes management     Patient accepts CC: No, Not at this time. Patient will be sent Care Coordination introduction letter for future reference.    Plan: Care Coordinator will send care coordination introduction letter with care coordinator contact information and explanation of care coordination services via mail. Care Coordinator will do no further outreaches at this time.    JERONIMO Porras  Clinic Care Coordination  [facility]: [facility]   Phone: [phone]

## 2020-10-29 NOTE — TELEPHONE ENCOUNTER
Reason for Call:  Form, our goal is to have forms completed with 72 hours, however, some forms may require a visit or additional information.    Type of letter, form or note:  medical    Who is the form from?: Home care    Where did the form come from: form was faxed in    What clinic location was the form placed at?: [facility]    Where the form was placed: [facility] Box/Folder    What number is listed as a contact on the form?: [phone]       Additional comments: [facility]: SN 1 Week 3, 2 As Needed    Call taken on 10/29/2020 at 8:43 AM by Michell Barry

## 2020-11-10 ENCOUNTER — OFFICE VISIT (OUTPATIENT)
Dept: FAMILY MEDICINE | Facility: CLINIC | Age: 85
End: 2020-11-10
Payer: COMMERCIAL

## 2020-11-10 VITALS
HEART RATE: 77 BPM | TEMPERATURE: 98.4 F | OXYGEN SATURATION: 99 % | BODY MASS INDEX: 21.08 KG/M2 | SYSTOLIC BLOOD PRESSURE: 158 MMHG | WEIGHT: 119 LBS | DIASTOLIC BLOOD PRESSURE: 78 MMHG | RESPIRATION RATE: 14 BRPM

## 2020-11-10 DIAGNOSIS — R41.89 COGNITIVE IMPAIRMENT: Primary | ICD-10-CM

## 2020-11-10 DIAGNOSIS — I10 HYPERTENSION WITH GOAL BLOOD PRESSURE LESS THAN 140/80: ICD-10-CM

## 2020-11-10 DIAGNOSIS — Z23 NEED FOR PROPHYLACTIC VACCINATION AND INOCULATION AGAINST INFLUENZA: ICD-10-CM

## 2020-11-10 DIAGNOSIS — E11.9 TYPE 2 DIABETES MELLITUS WITHOUT COMPLICATION, WITH LONG-TERM CURRENT USE OF INSULIN (H): ICD-10-CM

## 2020-11-10 DIAGNOSIS — Z79.4 TYPE 2 DIABETES MELLITUS WITHOUT COMPLICATION, WITH LONG-TERM CURRENT USE OF INSULIN (H): ICD-10-CM

## 2020-11-10 PROCEDURE — G0008 ADMIN INFLUENZA VIRUS VAC: HCPCS | Performed by: INTERNAL MEDICINE

## 2020-11-10 PROCEDURE — 90662 IIV NO PRSV INCREASED AG IM: CPT | Performed by: INTERNAL MEDICINE

## 2020-11-10 PROCEDURE — 99214 OFFICE O/P EST MOD 30 MIN: CPT | Mod: 25 | Performed by: INTERNAL MEDICINE

## 2020-11-10 NOTE — PROGRESS NOTES
Subjective     Chapis Riggs is a 89 year old female who presents to clinic today for the following health issues:    HPI         Diabetes Follow-up      How often are you checking your blood sugar? Not at all    What concerns do you have today about your diabetes? None     Do you have any of these symptoms? (Select all that apply)  No numbness or tingling in feet.  No redness, sores or blisters on feet.  No complaints of excessive thirst.  No reports of blurry vision.  No significant changes to weight.              Hyperlipidemia Follow-Up      Are you regularly taking any medication or supplement to lower your cholesterol?   Yes- Zetia    Are you having muscle aches or other side effects that you think could be caused by your cholesterol lowering medication?  No    Hypertension Follow-up      Do you check your blood pressure regularly outside of the clinic? No     Are you following a low salt diet? No    Are your blood pressures ever more than 140 on the top number (systolic) OR more   than 90 on the bottom number (diastolic), for example 140/90? No    BP Readings from Last 2 Encounters:   11/10/20 (!) 158/78   09/14/20 126/74     Hemoglobin A1C (%)   Date Value   09/14/2020 7.3 (H)   07/09/2019 7.6 (H)     LDL Cholesterol Calculated (mg/dL)   Date Value   06/25/2018 89   09/14/2015 102     LDL Cholesterol Direct (mg/dL)   Date Value   10/24/2016 91         How many servings of fruits and vegetables do you eat daily?  0-1    On average, how many sweetened beverages do you drink each day (Examples: soda, juice, sweet tea, etc.  Do NOT count diet or artificially sweetened beverages)?   0    How many days per week do you exercise enough to make your heart beat faster? None    How many minutes a day do you exercise enough to make your heart beat faster? None    How many days per week do you miss taking your medication? 0    Concern - Ulcer on bottom  Onset: Middle of September  Description: family does not know  if it is right or left side  Intensity: mild  Progression of Symptoms:  constant  Accompanying Signs & Symptoms: See above  Previous history of similar problem: NA  Precipitating factors:        Worsened by: sitting  Alleviating factors:        Improved by:   Therapies tried and outcome: None                                                                                                                                                                                                                       Here with her son and her daughter-in-law.    They did have an RN from [facility], and evaluate the patient.  That went okay.         Then they tried to have someone from an agency come in for a few hours in the morning every day, but the patient adamantly refused to let them come back in.  She currently adamantly refuses to have anyone coming to her home other than her close relatives.       Her daughters call her several times per day, and her son and his wife come to town once per week to check on her provide her with groceries etc.                                                                       BP has been high at home, per the [facility].    They think she is taking her meds, but they do not know for sure.           They state that she will not let anyone near her pill bottles.                   There was also a question of a decubitus ulcer.              Current Outpatient Medications   Medication Sig Dispense Refill     amLODIPine (NORVASC) 10 MG tablet Take 1 tablet (10 mg) by mouth daily 90 tablet 3     aspirin 81 MG tablet Take 1 tablet by mouth daily.       bisoprolol (ZEBETA) 10 MG tablet TAKE TWO TABLETS BY MOUTH DAILY 180 tablet 2     losartan (COZAAR) 100 MG tablet TAKE 1 TABLET BY MOUTH EVERY DAY 90 tablet 4     Multiple Vitamins-Minerals (CENTRUM SILVER PO) Take 1 tablet by mouth.       nitroGLYcerin (NITROSTAT) 0.4 MG sublingual tablet Place 1 tablet (0.4 mg) under the tongue every 5 minutes as  needed for chest pain 25 tablet 3     NOVOLOG MIX 70/30 FLEXPEN (70-30) 100 UNIT/ML susp INJECT 8 UNITS SUBCUTANEOUSLY EVERY MORNING, INJECT 5 UNITS EVERY EVENING, 15-30 MIN BEFORE MEAL 15 mL 11     ONETOUCH ULTRA test strip TEST 4 TIMES A  strip 1         Review of Systems   CONSTITUTIONAL:NEGATIVE for fever, chills, change in weight  RESP:NEGATIVE for significant cough or SOB  CV: NEGATIVE for chest pain, palpitations or peripheral edema      Wt Readings from Last 4 Encounters:   11/10/20 54 kg (119 lb)   09/14/20 53.5 kg (118 lb)   07/29/20 54 kg (119 lb)   01/27/20 51.3 kg (113 lb)       Objective    BP (!) 158/78   Pulse 77   Temp 98.4  F (36.9  C) (Tympanic)   Resp 14   Wt 54 kg (119 lb)   SpO2 99%   BMI 21.08 kg/m    Body mass index is 21.08 kg/m .  Physical Exam   GENERAL APPEARANCE: alert and no distress  RESP: lungs clear to auscultation - no rales, rhonchi or wheezes  CV: regular rates and rhythm, normal S1 S2, no S3 or S4 and no murmur, click or rub  SKIN: At the top of the gluteal cleft, there is some dry thickened skin but no skin breakdown is present.  I did apply a Tegaderm to this area.  PSYCH: tangential and she perseverates, repeatedly, regarding her various medications, their indications, etc.  Her cognitive impairment is very clear when she starts perseverating like this.            Assessment & Plan     Chapis was seen today for diabetes, hypertension, lipids, ulcer and imm/inj.    Diagnoses and all orders for this visit:    Cognitive impairment    Hypertension with goal blood pressure less than 140/80    Type 2 diabetes mellitus without complication, with long-term current use of insulin (H)    Need for prophylactic vaccination and inoculation against influenza  -     FLUZONE HIGH DOSE 65+  [76965]  -     ADMIN INFLUENZA (For MEDICARE Patients ONLY) []            Summary and implications:  We reviewed multiple issues.           We reviewed all of the issues on the diagnoses  list.              Patient has decided that no outside agencies will be involved in her care, and the patient's family has apparently become resigned to this.  They are doing their best to keep her safe in her home.             We reviewed the situation at length.  Her blood pressure is slightly elevated today.  However, since we do not know how accurately she is taking her medications, and she will not let anyone help her, it is very difficult to adjust her antihypertensive medications.            25 minute visit,over half counseling and coordination of care    Patient Instructions   Keep taking the same medications.                                 Have a good, safe fall and winter; no falls!      Return in about 6 months (around 5/10/2021) for follow up of several issues.    Christiano Carvalho MD  [facility]

## 2020-11-10 NOTE — PATIENT INSTRUCTIONS
Keep taking the same medications.                                 Have a good, safe fall and winter; no falls!

## 2020-12-15 PROBLEM — R42 DIZZINESS: Status: ACTIVE | Noted: 2020-12-15

## 2020-12-16 ENCOUNTER — VIRTUAL VISIT (OUTPATIENT)
Dept: INTERNAL MEDICINE | Facility: CLINIC | Age: 85
End: 2020-12-16
Payer: COMMERCIAL

## 2020-12-16 DIAGNOSIS — E11.9 TYPE 2 DIABETES MELLITUS WITHOUT COMPLICATION, WITH LONG-TERM CURRENT USE OF INSULIN (H): ICD-10-CM

## 2020-12-16 DIAGNOSIS — Z79.4 TYPE 2 DIABETES MELLITUS WITHOUT COMPLICATION, WITH LONG-TERM CURRENT USE OF INSULIN (H): ICD-10-CM

## 2020-12-16 DIAGNOSIS — R41.0 CONFUSION: ICD-10-CM

## 2020-12-16 DIAGNOSIS — R41.3 MEMORY LOSS: Primary | Chronic | ICD-10-CM

## 2020-12-16 DIAGNOSIS — F03.90 DEMENTIA WITHOUT BEHAVIORAL DISTURBANCE, UNSPECIFIED DEMENTIA TYPE: ICD-10-CM

## 2020-12-16 PROCEDURE — 99214 OFFICE O/P EST MOD 30 MIN: CPT | Mod: 95 | Performed by: INTERNAL MEDICINE

## 2020-12-16 NOTE — PROGRESS NOTES
"Chapis Riggs is a 89 year old female who is being evaluated via a billable telephone visit.      The patient has been notified of following:     \"This telephone visit will be conducted via a call between you and your physician/provider. We have found that certain health care needs can be provided without the need for a physical exam.  This service lets us provide the care you need with a short phone conversation.  If a prescription is necessary we can send it directly to your pharmacy.  If lab work is needed we can place an order for that and you can then stop by our lab to have the test done at a later time.    Telephone visits are billed at different rates depending on your insurance coverage. During this emergency period, for some insurers they may be billed the same as an in-person visit.  Please reach out to your insurance provider with any questions.    If during the course of the call the physician/provider feels a telephone visit is not appropriate, you will not be charged for this service.\"    Patient has given verbal consent for Telephone visit?  Yes    What phone number would you like to be contacted at? [phone]    How would you like to obtain your AVS? Mail a copy    Subjective     Chapis Riggs is a 89 year old female who presents via phone visit today for the following health issues:    HPI     Patient will go into \"states\" where she will call people and they will come over - pts son only has 1 lung so very concerned about covid.    From about 130 - 730 yesterday pts daughter in law said pt did not make any sense at all, sentences did not make sense from one to the other. Did not know she was in her home last night and started packing a bag to go home.    Patient refused to take insulin this morning.    Some deconditioning going on as well - sitting/laying around more.    Some personality changes.    Thinking about sending her to a nursing home - memory care.                   This " was a phone call with the patient, with her son, and with her daughter-in-law.        This patient has progressively worsening confusion, perseveration, and they gave multiple examples of her declining cognition.      She can no longer remember how to administer her insulin, and therefore she has stopped giving insulin to herself.  Family members have been helping her.  She is confused regarding the relation ship of various people namely her children and their family members.  She has frequently stated she does not believe she is at home when she is home, and she will pack a bag to leave to go home.  She has had episodes of even worse confusion, with word salad conversation.                   Family members have sometimes helped her check her glucose, and she is never hypoglycemic.             Family feels that it is time for this patient to move to memory care.  Her son and daughter-in-law live in [place], and they have      found a memory care facility there and her name is on the waiting list.  However to date, patient has not given approval for power of  for financial affairs.     This patient's daughter will be arriving soon from [place], to take her back to [place] to live with her for 2 weeks.  We discussed that this may be a good time for her move to the memory care unit, namely bring her right there when she returns from [place].                This patient glosses over any of these difficulties, saying that she is a bit forgetful but nothing worse than that.         Family has been paying her bills.  This patient has not been able to manage that at all.    Current Outpatient Medications   Medication Sig Dispense Refill     amLODIPine (NORVASC) 10 MG tablet Take 1 tablet (10 mg) by mouth daily 90 tablet 3     aspirin 81 MG tablet Take 1 tablet by mouth daily.       bisoprolol (ZEBETA) 10 MG tablet TAKE TWO TABLETS BY MOUTH DAILY 180 tablet 2     losartan (COZAAR) 100 MG tablet TAKE 1 TABLET BY  MOUTH EVERY DAY 90 tablet 4     Multiple Vitamins-Minerals (CENTRUM SILVER PO) Take 1 tablet by mouth.       nitroGLYcerin (NITROSTAT) 0.4 MG sublingual tablet Place 1 tablet (0.4 mg) under the tongue every 5 minutes as needed for chest pain 25 tablet 3     NOVOLOG MIX 70/30 FLEXPEN (70-30) 100 UNIT/ML susp INJECT 8 UNITS SUBCUTANEOUSLY EVERY MORNING, INJECT 5 UNITS EVERY EVENING, 15-30 MIN BEFORE MEAL 15 mL 11     ONETOUCH ULTRA test strip TEST 4 TIMES A  strip 1     Patient Active Problem List    Diagnosis Date Noted     Coronary artery disease involving native heart with angina pectoris, unspecified vessel or lesion type (H) 03/25/2019     Priority: High     Pravastatin stopped by dtr in 7/19       Type 2 diabetes mellitus without complication, with long-term current use of insulin (H) 08/25/2018     Priority: High     CAD (coronary artery disease) 03/24/2014     Priority: High     Mild per 2009 evaluation                          Pravastatin stopped by dtr in 7/19       History of colonic polyps 01/30/2014     Priority: High     2004; a 2.5 cm polyp at 25 cm removed by Dr. Fabian; a full colonoscopy was done.                  In 7/09, he was not able to do a full colonoscopy as pt did not tolerate the exam.    Neg to mid-transverse colon.        In 9/17, flex sig neg to 45 cm           Type 2 diabetes mellitus without complication (H) 01/28/2014     Priority: High     Managed by ;        Dx approx 2003.   Rx insulin from the beginning.                               A1C 8.0 in 5/14; TSH  nml       Other and unspecified angina pectoris 10/26/2012     Priority: High     2009; nuc med adenosine stress suggests small area of anterior ischemia; medical Rx advised.     Stress echo 10/12 showed some LVH, nml EF, no wall motion abnormalities with exercise.                          Uses NTG rarely.          Normal ECG in 3/14       Hypertension with goal blood pressure less than 140/80 10/26/2012      Priority: High     Memory loss 10/26/2012     Priority: High      B12 and thyroid levels normal in 2014 and in 2018/2019         Her STM is very poor; she perseverates incessantly       Hyperlipidemia LDL goal <100 10/25/2012     Priority: High     Pravastatin stopped by dtr in 7/19       Dizziness 12/15/2020     Priority: Medium     See ER note 7/20, with head CT and CTA head and neck       Herpes zoster without complication 01/13/2020     Priority: Medium     R L1 dermatome       Axillary adenopathy 01/13/2020     Priority: Medium     Pain of right lower leg 07/23/2018     Priority: Medium     Chronic midline low back pain without sciatica 08/28/2017     Priority: Medium     Epistaxis 11/28/2016     Priority: Medium     Cough 08/25/2014     Priority: Medium     CXR neg in 11/15 and 5/16       Ganglion cyst 03/24/2014     Priority: Medium     L hand and wrist               In 8/18, medial aspect R great toe       Arthritis 01/29/2014     Priority: Medium     Sx of; takes aleve rarely   ;       See imaging 2018; both hips, and lumbar spine       Diverticulitis of colon 10/26/2012     Priority: Medium     Problem list name updated by automated process. Provider to review       ACP (advance care planning) 11/03/2015     Priority: Low     Advance Care Planning 11/3/2015: ACP Review and Resources Provided:  Reviewed chart for advance care plan.  Chapistiffanie Marie Oneil has no plan or code status on file. Discussed available resources and provided with information. Added by Su Francis             Preventive measure 01/28/2014     Priority: Low     Colonoscopy only to mid-transverse colon in 7/09  Mammogram 8/12, neg;breast exam 10/16;7/18 1/20                        Pap smear 1/03  DXA 2005; results???                                     Pre-visit report from [facility] reviewed       Past Surgical History:   Procedure Laterality Date     ANAL PAPILLA      PROLAPSED     BREAST SURGERY      BIOPSY     GYN SURGERY       HYSTEROSCOPY/TUBAL     REPAIR PTOSIS BILATERAL  2011    Procedure:REPAIR PTOSIS BILATERAL; BILATERAL UPPER LID PTOSIS REPAIR; Surgeon:SALINAS DUNN; Location:[facility]     SIGMOIDOSCOPY FLEXIBLE N/A 2017    Procedure: SIGMOIDOSCOPY FLEXIBLE;  SIGMOIDOSCOPY FLEXIBLE;  Surgeon: Thang Begum MD;  Location:  GI     Social History     Socioeconomic History     Marital status:      Spouse name: Not on file     Number of children: 5     Years of education: Not on file     Highest education level: Not on file   Occupational History     Employer: RETIRED   Social Needs     Financial resource strain: Not on file     Food insecurity     Worry: Not on file     Inability: Not on file     Transportation needs     Medical: Not on file     Non-medical: Not on file   Tobacco Use     Smoking status: Never Smoker     Smokeless tobacco: Never Used   Substance and Sexual Activity     Alcohol use: No     Drug use: No     Sexual activity: Not Currently   Lifestyle     Physical activity     Days per week: Not on file     Minutes per session: Not on file     Stress: Not on file   Relationships     Social connections     Talks on phone: Not on file     Gets together: Not on file     Attends Hoahaoism service: Not on file     Active member of club or organization: Not on file     Attends meetings of clubs or organizations: Not on file     Relationship status: Not on file     Intimate partner violence     Fear of current or ex partner: Not on file     Emotionally abused: Not on file     Physically abused: Not on file     Forced sexual activity: Not on file   Other Topics Concern     Parent/sibling w/ CABG, MI or angioplasty before 65F 55M? No   Social History Narrative    1 son is ; cancer;                    3 dtrs,1 living son     Immunization History   Administered Date(s) Administered     Influenza (High Dose) 3 valent vaccine 2012, 2013, 10/27/2014, 10/24/2016, 2017, 2019      Influenza (IIV3) PF 10/27/2008, 01/12/2010, 10/14/2010     Influenza, Quad, High Dose, Pf, 65yr + 11/10/2020     Pneumo Conj 13-V (2010&after) 09/14/2015     Pneumococcal 23 valent 01/01/2008     TD (ADULT, 7+) 10/24/2007         Review of Systems   CONSTITUTIONAL:NEGATIVE for fever, chills, change in weight  MUSCULOSKELETAL: Family reports she is moving more slowly, bit flexed at the waist  NEURO: POSITIVE for behavior changes and memory problems and NEGATIVE for HX CVA       Objective          Vitals:  No vitals were obtained today due to virtual visit.      PSYCH: Alert; perseverates; repeats the same questions and comments  RESP: No cough, no audible wheezing, able to talk in full sentences  Remainder of exam unable to be completed due to telephone visits            Assessment/Plan:    Assessment & Plan     Chapis was seen today for dementia.    Diagnoses and all orders for this visit:    Memory loss    Dementia without behavioral disturbance, unspecified dementia type (H)    Confusion    Type 2 diabetes mellitus without complication, with long-term current use of insulin (H)            Summary and implications:  We reviewed multiple issues.           We reviewed all of the issues on the diagnoses list.                         We discussed her situation at length.    Family needs to go to an  and try to get power of  for financial affairs.  She clearly has progressive dementia and is unable to manage her own finances.       We discussed that timing of a move to a memory care unit could be when she returns from [place], namely to go directly to the memory care unit rather than to her home.  Patient Instructions              You need 24/7 care going forward.                                        Return in about 3 months (around 3/16/2021) for follow up of several issues.    Christiano Carvalho MD  [facility]    Phone call duration:  18 minutes

## 2020-12-21 ENCOUNTER — TELEPHONE (OUTPATIENT)
Dept: FAMILY MEDICINE | Facility: CLINIC | Age: 85
End: 2020-12-21

## 2020-12-21 NOTE — TELEPHONE ENCOUNTER
Reason for call:  Other   Patient called regarding (reason for call): call back  Additional comments: contacted a - he is drawing up guardianship papers.  is faxing you forms  His name is Quinn Gaytan    Also they cannot get the memory care until this is approved  So they will be calling you this am    Phone number to reach patient:  [phone]    Best Time:  any    Can we leave a detailed message on this number?  YES    Travel screening: Not Applicable

## 2020-12-22 ENCOUNTER — PATIENT OUTREACH (OUTPATIENT)
Dept: NURSING | Facility: CLINIC | Age: 85
End: 2020-12-22
Payer: COMMERCIAL

## 2020-12-22 NOTE — PROGRESS NOTES
Clinic Care Coordination Contact  Received a call from patient's daughter-in-law, Sonal.  Sonal had some questions on the CC program and home health care.  CHW answered Sonal's questions.    CHW spoke with Sonal on 10/29/20 regarding the Care Coordination program.  Sonal declined at that time.   Patient's daughter in law, Sonal is not interested in the CC program.    No further outreaches will be made.    JERONIMO Porras  Clinic Care Coordination  [facility]: [facility]  Phone: [phone]     Mitral regurgitation at this point appears mild but we will get an echocardiogram to evaluate the intensity of the mitral regurgitation.  She has seen Dr. Carey of cardiology who was not recommended mitral valve clipping so far.

## 2020-12-22 NOTE — TELEPHONE ENCOUNTER
Will wait for form to be faxed over and will put on pcp's desk.  Form needs to be filled out 12/23 morning and faxed back to  asap. When this has been done please call patients daughter in law Pruitt at [phone] and let her know.

## 2020-12-23 NOTE — TELEPHONE ENCOUNTER
Reason for Call:  Other call back    Detailed comments: they are still waiting for form to be faxed please advise. Thank you     Phone Number Patient can be reached at: Other phone number:  [phone]    Best Time: any    Can we leave a detailed message on this number? YES    Call taken on 12/23/2020 at 12:48 PM by Dakotah Bailey

## 2020-12-23 NOTE — TELEPHONE ENCOUNTER
Family calling and advising that there is a box on the form that needs to be checked, the box is above the providers signature and provider needs to state the reason why the patient cannot go to the hearing. Once completed needs to be faxed to the , ASAP. Fax: [phone]      .Maryam BOONE    [facility] Neda Stephenson

## 2020-12-23 NOTE — TELEPHONE ENCOUNTER
"I put the completed form in the box labelled \"to be faxed\".                 Please scan it and fax it and let the son (Gianna Riggs) know that it has been sent.   "

## 2021-01-04 ENCOUNTER — TELEPHONE (OUTPATIENT)
Dept: FAMILY MEDICINE | Facility: CLINIC | Age: 86
End: 2021-01-04

## 2021-01-04 DIAGNOSIS — Z53.9 DIAGNOSIS NOT YET DEFINED: Primary | ICD-10-CM

## 2021-01-04 PROCEDURE — G0180 MD CERTIFICATION HHA PATIENT: HCPCS | Performed by: INTERNAL MEDICINE

## 2021-01-04 NOTE — TELEPHONE ENCOUNTER
The only other doctor I see on the record who has worked with this patient is Dr. Santoro of endocrinology.                          You could suggest to the family that they send the request to him.

## 2021-01-04 NOTE — TELEPHONE ENCOUNTER
Reason for Call:  Other call back    Detailed comments: Needs 2 doctors signatures to deem her incapacitated to manage funds. Is wondering if PCP can help with this. PCP has already written one letter but family needs a different provider's signature to access trust    Phone Number Patient can be reached at: Other phone number:  [phone]    Best Time: Anytime    Can we leave a detailed message on this number? YES    Call taken on 1/4/2021 at 1:19 PM by Francisca Calderon

## 2021-01-04 NOTE — TELEPHONE ENCOUNTER
Endo please advise would you be willing to write family a letter?  Letter must say pt is unable to properly handle her own affairs by reason of physical illness or mental illness. Then sign.    [phone] Sonal Riggs, Daughter in law, call with questions  Email to Sonal at [email or web address]    Cassandra NAJERA, RN, PHN

## 2021-01-04 NOTE — TELEPHONE ENCOUNTER
Interesting messages noted.  I have not seen Mrs RICO since 12/7/18 (over 2 years ago) and have no knowledge of her recent physical or cognitive abilities.  I will not be able to write such a letter... suggest that her PCP help find another physician to assist with this task.    ELIAS Santoro MD, MS  [facility]

## 2021-01-04 NOTE — TELEPHONE ENCOUNTER
It looks like she may need to have an office visit with a physician in order to get the paperwork signed.                           I do not think we can ask someone who has not seen this patient to sign paperwork stating that this patient is unable to properly handle her own affairs.

## 2021-01-05 NOTE — TELEPHONE ENCOUNTER
Patient is not in [place] at this time and is unable to schedule a visit. Once she returns to [place] she will be admitted to a nursing home near [place], patient's sone and daughter in law will arrange for her to meet with a physician there.

## 2021-01-07 ENCOUNTER — TELEPHONE (OUTPATIENT)
Dept: FAMILY MEDICINE | Facility: CLINIC | Age: 86
End: 2021-01-07

## 2021-01-07 NOTE — TELEPHONE ENCOUNTER
Pt needs letter saying that she is incapacitated due to dementia. Take info from form that was filled out and put it in letter form on letter head. Cannot manage her financial and medical needs.    Sonal Riggs [phone], ok to lv detailed message    email to [email or web address]

## 2021-01-07 NOTE — LETTER
January 7, 2021      Chapistiffanie Riggs  [address] [place] [address]        To Whom It May Concern                  I have been asked to provide additional details regarding this patient's cognitive impairment.                       She is incapacitated due to dementia.  She cannot manage her financial or medical needs.               If you have any questions or concerns, please call the clinic at the number listed above.       Sincerely,      Christiano Carvalho MD

## 2021-01-08 ENCOUNTER — TELEPHONE (OUTPATIENT)
Dept: FAMILY MEDICINE | Facility: CLINIC | Age: 86
End: 2021-01-08

## 2021-01-08 NOTE — TELEPHONE ENCOUNTER
Ok to admit patient.                           Her care at the NH will need to be taken over by their local provider.                                      Send this statement , plus send my office notes dated 11/10 and 12/16/20.             Thanks.                                Christiano Carvalho MD

## 2021-01-08 NOTE — TELEPHONE ENCOUNTER
Received call from Alina with [facility] in [place], [place].  Patient is going to be admitted to memory care facility on Monday and Alina is asking for the followin) Orders to admit patient  2) Current and updated medication list  3) Current treatments/plan, medical history  4) Most up to date H&P    Fax all the above to Attn: Alina at [phone].    Call Alina with any questions. Would like all faxes received today as patient is slated to be admitted on Monday.    Routing to care team to review and address.    Candy Golden RN  [facility]

## 2021-01-08 NOTE — TELEPHONE ENCOUNTER
Please see message below. I do not see any orders in chart to admit patient.    Lenka Murillo MA

## 2021-01-13 DIAGNOSIS — I10 ESSENTIAL HYPERTENSION WITH GOAL BLOOD PRESSURE LESS THAN 130/80: ICD-10-CM

## 2021-01-13 RX ORDER — AMLODIPINE BESYLATE 10 MG/1
TABLET ORAL
Qty: 90 TABLET | Refills: 1 | Status: SHIPPED | OUTPATIENT
Start: 2021-01-13

## 2021-01-26 ENCOUNTER — TELEPHONE (OUTPATIENT)
Dept: INTERNAL MEDICINE | Facility: CLINIC | Age: 86
End: 2021-01-26

## 2021-01-26 NOTE — TELEPHONE ENCOUNTER
Dtr in law was called, family is having a dispute whether pt should get the COVID vaccine or not. It is currently offered at assisted living. They would like a letter addressed to Chapis's family with Dr. Carvalho message below.     Letter composed, left for Sonal Riggs to .

## 2021-01-26 NOTE — LETTER
[facility]  [address]  [address]  Phone  [phone]  Fax   [phone]      Dear Chapis and family:     It is a good idea for everyone in assisted living to get the COVID vaccine including Chapis. Please feel free to call [facility] with any questions.         Sincerely,       Dr. Christiano Carvalho.

## 2021-01-26 NOTE — TELEPHONE ENCOUNTER
Per daughter in law. Pt is now in assistanted living in [facility]. She needs a letter stating that the Doctor agrees that it is a good idea to get the covid vaccine.     Daughter in laws number is :[phone].    Please mail this letter to : [id number] [place] [address]

## 2021-01-26 NOTE — TELEPHONE ENCOUNTER
Why would such a letter be needed?    It is a good idea for everyone in assisted living to get the COVID vaccine.              Please get more information regarding this issue.